# Patient Record
Sex: MALE | Race: WHITE | NOT HISPANIC OR LATINO | URBAN - METROPOLITAN AREA
[De-identification: names, ages, dates, MRNs, and addresses within clinical notes are randomized per-mention and may not be internally consistent; named-entity substitution may affect disease eponyms.]

---

## 2023-03-20 ENCOUNTER — PREP FOR SURGERY (OUTPATIENT)
Dept: OTHER | Facility: HOSPITAL | Age: 43
End: 2023-03-20
Payer: COMMERCIAL

## 2023-03-20 ENCOUNTER — HOSPITAL ENCOUNTER (INPATIENT)
Facility: HOSPITAL | Age: 43
LOS: 3 days | Discharge: HOME | DRG: 885 | End: 2023-03-23
Attending: PSYCHIATRY & NEUROLOGY | Admitting: PSYCHIATRY & NEUROLOGY
Payer: COMMERCIAL

## 2023-03-20 DIAGNOSIS — F29 PSYCHOSIS: ICD-10-CM

## 2023-03-20 DIAGNOSIS — F29 PSYCHOSIS: Primary | ICD-10-CM

## 2023-03-20 LAB
ALBUMIN SERPL-MCNC: 3.7 G/DL (ref 3.5–5.2)
ALBUMIN/GLOB SERPL: 1.5 G/DL
ALP SERPL-CCNC: 77 U/L (ref 39–117)
ALT SERPL W P-5'-P-CCNC: 20 U/L (ref 1–41)
ANION GAP SERPL CALCULATED.3IONS-SCNC: 7.9 MMOL/L (ref 5–15)
AST SERPL-CCNC: 19 U/L (ref 1–40)
BASOPHILS # BLD AUTO: 0.13 10*3/MM3 (ref 0–0.2)
BASOPHILS NFR BLD AUTO: 1 % (ref 0–1.5)
BILIRUB SERPL-MCNC: 0.2 MG/DL (ref 0–1.2)
BUN SERPL-MCNC: 8 MG/DL (ref 6–20)
BUN/CREAT SERPL: 7.9 (ref 7–25)
CALCIUM SPEC-SCNC: 9.3 MG/DL (ref 8.6–10.5)
CHLORIDE SERPL-SCNC: 104 MMOL/L (ref 98–107)
CO2 SERPL-SCNC: 27.1 MMOL/L (ref 22–29)
CREAT SERPL-MCNC: 1.01 MG/DL (ref 0.76–1.27)
DEPRECATED RDW RBC AUTO: 40.1 FL (ref 37–54)
EGFRCR SERPLBLD CKD-EPI 2021: 96.4 ML/MIN/1.73
EOSINOPHIL # BLD AUTO: 0.58 10*3/MM3 (ref 0–0.4)
EOSINOPHIL NFR BLD AUTO: 4.4 % (ref 0.3–6.2)
ERYTHROCYTE [DISTWIDTH] IN BLOOD BY AUTOMATED COUNT: 12.1 % (ref 12.3–15.4)
ETHANOL BLD-MCNC: <10 MG/DL (ref 0–10)
ETHANOL UR QL: <0.01 %
GLOBULIN UR ELPH-MCNC: 2.4 GM/DL
GLUCOSE SERPL-MCNC: 114 MG/DL (ref 65–99)
HCT VFR BLD AUTO: 46.4 % (ref 37.5–51)
HGB BLD-MCNC: 16.2 G/DL (ref 13–17.7)
IMM GRANULOCYTES # BLD AUTO: 0.04 10*3/MM3 (ref 0–0.05)
IMM GRANULOCYTES NFR BLD AUTO: 0.3 % (ref 0–0.5)
LYMPHOCYTES # BLD AUTO: 2.3 10*3/MM3 (ref 0.7–3.1)
LYMPHOCYTES NFR BLD AUTO: 17.5 % (ref 19.6–45.3)
MCH RBC QN AUTO: 31.3 PG (ref 26.6–33)
MCHC RBC AUTO-ENTMCNC: 34.9 G/DL (ref 31.5–35.7)
MCV RBC AUTO: 89.7 FL (ref 79–97)
MONOCYTES # BLD AUTO: 0.64 10*3/MM3 (ref 0.1–0.9)
MONOCYTES NFR BLD AUTO: 4.9 % (ref 5–12)
NEUTROPHILS NFR BLD AUTO: 71.9 % (ref 42.7–76)
NEUTROPHILS NFR BLD AUTO: 9.49 10*3/MM3 (ref 1.7–7)
NRBC BLD AUTO-RTO: 0 /100 WBC (ref 0–0.2)
PLATELET # BLD AUTO: 233 10*3/MM3 (ref 140–450)
PMV BLD AUTO: 9.8 FL (ref 6–12)
POTASSIUM SERPL-SCNC: 4.8 MMOL/L (ref 3.5–5.2)
PROT SERPL-MCNC: 6.1 G/DL (ref 6–8.5)
RBC # BLD AUTO: 5.17 10*6/MM3 (ref 4.14–5.8)
SODIUM SERPL-SCNC: 139 MMOL/L (ref 136–145)
T4 FREE SERPL-MCNC: 1.12 NG/DL (ref 0.93–1.7)
TSH SERPL DL<=0.05 MIU/L-ACNC: 1.41 UIU/ML (ref 0.27–4.2)
WBC NRBC COR # BLD: 13.18 10*3/MM3 (ref 3.4–10.8)

## 2023-03-20 PROCEDURE — 82077 ASSAY SPEC XCP UR&BREATH IA: CPT | Performed by: PSYCHIATRY & NEUROLOGY

## 2023-03-20 PROCEDURE — 85025 COMPLETE CBC W/AUTO DIFF WBC: CPT | Performed by: PSYCHIATRY & NEUROLOGY

## 2023-03-20 PROCEDURE — 84443 ASSAY THYROID STIM HORMONE: CPT | Performed by: PSYCHIATRY & NEUROLOGY

## 2023-03-20 PROCEDURE — 80053 COMPREHEN METABOLIC PANEL: CPT | Performed by: PSYCHIATRY & NEUROLOGY

## 2023-03-20 PROCEDURE — 84439 ASSAY OF FREE THYROXINE: CPT | Performed by: PSYCHIATRY & NEUROLOGY

## 2023-03-20 RX ORDER — ALUMINA, MAGNESIA, AND SIMETHICONE 2400; 2400; 240 MG/30ML; MG/30ML; MG/30ML
15 SUSPENSION ORAL EVERY 6 HOURS PRN
Status: DISCONTINUED | OUTPATIENT
Start: 2023-03-20 | End: 2023-03-23 | Stop reason: HOSPADM

## 2023-03-20 RX ORDER — RISPERIDONE 2 MG/1
2 TABLET ORAL 2 TIMES DAILY
Status: CANCELLED | OUTPATIENT
Start: 2023-03-20

## 2023-03-20 RX ORDER — LORAZEPAM 2 MG/ML
2 INJECTION INTRAMUSCULAR EVERY 4 HOURS PRN
Status: CANCELLED | OUTPATIENT
Start: 2023-03-20 | End: 2023-03-27

## 2023-03-20 RX ORDER — HALOPERIDOL 5 MG/ML
5 INJECTION INTRAMUSCULAR EVERY 4 HOURS PRN
Status: CANCELLED | OUTPATIENT
Start: 2023-03-20

## 2023-03-20 RX ORDER — LORAZEPAM 2 MG/1
2 TABLET ORAL EVERY 4 HOURS PRN
Status: CANCELLED | OUTPATIENT
Start: 2023-03-20 | End: 2023-03-27

## 2023-03-20 RX ORDER — DIPHENHYDRAMINE HCL 50 MG
50 CAPSULE ORAL EVERY 4 HOURS PRN
Status: CANCELLED | OUTPATIENT
Start: 2023-03-20

## 2023-03-20 RX ORDER — ACETAMINOPHEN 325 MG/1
650 TABLET ORAL EVERY 4 HOURS PRN
Status: CANCELLED | OUTPATIENT
Start: 2023-03-20

## 2023-03-20 RX ORDER — LOPERAMIDE HYDROCHLORIDE 2 MG/1
2 CAPSULE ORAL
Status: CANCELLED | OUTPATIENT
Start: 2023-03-20

## 2023-03-20 RX ORDER — TRAZODONE HYDROCHLORIDE 50 MG/1
100 TABLET ORAL NIGHTLY PRN
Status: DISCONTINUED | OUTPATIENT
Start: 2023-03-20 | End: 2023-03-23 | Stop reason: HOSPADM

## 2023-03-20 RX ORDER — HALOPERIDOL 5 MG/1
5 TABLET ORAL EVERY 4 HOURS PRN
Status: CANCELLED | OUTPATIENT
Start: 2023-03-20

## 2023-03-20 RX ORDER — ACETAMINOPHEN 325 MG/1
650 TABLET ORAL EVERY 4 HOURS PRN
Status: DISCONTINUED | OUTPATIENT
Start: 2023-03-20 | End: 2023-03-23 | Stop reason: HOSPADM

## 2023-03-20 RX ORDER — NICOTINE 21 MG/24HR
1 PATCH, TRANSDERMAL 24 HOURS TRANSDERMAL
Status: CANCELLED | OUTPATIENT
Start: 2023-03-20

## 2023-03-20 RX ORDER — DIPHENHYDRAMINE HYDROCHLORIDE 50 MG/ML
50 INJECTION INTRAMUSCULAR; INTRAVENOUS EVERY 4 HOURS PRN
Status: CANCELLED | OUTPATIENT
Start: 2023-03-20

## 2023-03-20 RX ORDER — NICOTINE 21 MG/24HR
1 PATCH, TRANSDERMAL 24 HOURS TRANSDERMAL
Status: DISCONTINUED | OUTPATIENT
Start: 2023-03-20 | End: 2023-03-23 | Stop reason: HOSPADM

## 2023-03-20 RX ORDER — ALUMINA, MAGNESIA, AND SIMETHICONE 2400; 2400; 240 MG/30ML; MG/30ML; MG/30ML
15 SUSPENSION ORAL EVERY 6 HOURS PRN
Status: CANCELLED | OUTPATIENT
Start: 2023-03-20

## 2023-03-20 RX ORDER — HYDROXYZINE PAMOATE 50 MG/1
50 CAPSULE ORAL EVERY 6 HOURS PRN
Status: DISCONTINUED | OUTPATIENT
Start: 2023-03-20 | End: 2023-03-23 | Stop reason: HOSPADM

## 2023-03-20 RX ORDER — TRAZODONE HYDROCHLORIDE 100 MG/1
100 TABLET ORAL NIGHTLY PRN
Status: CANCELLED | OUTPATIENT
Start: 2023-03-20

## 2023-03-20 RX ORDER — HYDROXYZINE PAMOATE 50 MG/1
50 CAPSULE ORAL EVERY 6 HOURS PRN
Status: CANCELLED | OUTPATIENT
Start: 2023-03-20

## 2023-03-20 RX ORDER — LOPERAMIDE HYDROCHLORIDE 2 MG/1
2 CAPSULE ORAL
Status: DISCONTINUED | OUTPATIENT
Start: 2023-03-20 | End: 2023-03-23 | Stop reason: HOSPADM

## 2023-03-20 RX ADMIN — ACETAMINOPHEN 650 MG: 325 TABLET ORAL at 17:25

## 2023-03-20 RX ADMIN — ACETAMINOPHEN 650 MG: 325 TABLET ORAL at 23:20

## 2023-03-20 RX ADMIN — NICOTINE 1 PATCH: 21 PATCH, EXTENDED RELEASE TRANSDERMAL at 17:25
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
## 2023-03-21 PROBLEM — K40.90 INGUINAL HERNIA, RIGHT: Status: ACTIVE | Noted: 2023-03-21

## 2023-03-21 PROBLEM — K59.00 CONSTIPATION: Status: ACTIVE | Noted: 2023-03-21

## 2023-03-21 PROCEDURE — 99222 1ST HOSP IP/OBS MODERATE 55: CPT | Performed by: FAMILY MEDICINE

## 2023-03-21 RX ORDER — BISACODYL 5 MG/1
5 TABLET, DELAYED RELEASE ORAL DAILY PRN
Status: DISCONTINUED | OUTPATIENT
Start: 2023-03-21 | End: 2023-03-23 | Stop reason: HOSPADM

## 2023-03-21 RX ORDER — LORAZEPAM 2 MG/ML
2 INJECTION INTRAMUSCULAR EVERY 4 HOURS PRN
Status: DISCONTINUED | OUTPATIENT
Start: 2023-03-21 | End: 2023-03-23 | Stop reason: HOSPADM

## 2023-03-21 RX ORDER — POLYETHYLENE GLYCOL 3350 17 G/17G
17 POWDER, FOR SOLUTION ORAL DAILY PRN
Status: DISCONTINUED | OUTPATIENT
Start: 2023-03-21 | End: 2023-03-23 | Stop reason: HOSPADM

## 2023-03-21 RX ORDER — DIPHENHYDRAMINE HYDROCHLORIDE 50 MG/ML
50 INJECTION INTRAMUSCULAR; INTRAVENOUS EVERY 4 HOURS PRN
Status: DISCONTINUED | OUTPATIENT
Start: 2023-03-21 | End: 2023-03-23 | Stop reason: HOSPADM

## 2023-03-21 RX ORDER — HALOPERIDOL 5 MG/1
5 TABLET ORAL EVERY 4 HOURS PRN
Status: DISCONTINUED | OUTPATIENT
Start: 2023-03-21 | End: 2023-03-23 | Stop reason: HOSPADM

## 2023-03-21 RX ORDER — LORAZEPAM 2 MG/1
2 TABLET ORAL EVERY 4 HOURS PRN
Status: DISCONTINUED | OUTPATIENT
Start: 2023-03-21 | End: 2023-03-23 | Stop reason: HOSPADM

## 2023-03-21 RX ORDER — RISPERIDONE 2 MG/1
2 TABLET ORAL 2 TIMES DAILY
Status: DISCONTINUED | OUTPATIENT
Start: 2023-03-21 | End: 2023-03-23 | Stop reason: HOSPADM

## 2023-03-21 RX ORDER — AMOXICILLIN 250 MG
2 CAPSULE ORAL 2 TIMES DAILY
Status: DISCONTINUED | OUTPATIENT
Start: 2023-03-21 | End: 2023-03-23 | Stop reason: HOSPADM

## 2023-03-21 RX ORDER — HALOPERIDOL 5 MG/ML
5 INJECTION INTRAMUSCULAR EVERY 4 HOURS PRN
Status: DISCONTINUED | OUTPATIENT
Start: 2023-03-21 | End: 2023-03-23 | Stop reason: HOSPADM

## 2023-03-21 RX ORDER — DIPHENHYDRAMINE HCL 50 MG
50 CAPSULE ORAL EVERY 4 HOURS PRN
Status: DISCONTINUED | OUTPATIENT
Start: 2023-03-21 | End: 2023-03-23 | Stop reason: HOSPADM

## 2023-03-21 RX ORDER — BISACODYL 10 MG
10 SUPPOSITORY, RECTAL RECTAL DAILY PRN
Status: DISCONTINUED | OUTPATIENT
Start: 2023-03-21 | End: 2023-03-23 | Stop reason: HOSPADM

## 2023-03-21 RX ADMIN — ACETAMINOPHEN 650 MG: 325 TABLET ORAL at 20:06

## 2023-03-21 RX ADMIN — SENNOSIDES AND DOCUSATE SODIUM 2 TABLET: 8.6; 5 TABLET ORAL at 22:44

## 2023-03-21 RX ADMIN — NICOTINE 1 PATCH: 21 PATCH, EXTENDED RELEASE TRANSDERMAL at 12:00

## 2023-03-21 RX ADMIN — ACETAMINOPHEN 650 MG: 325 TABLET ORAL at 14:20
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
## 2023-03-21 NOTE — PLAN OF CARE
Goal Outcome Evaluation:  Plan of Care Reviewed With: patient  Patient Agreement with Plan of Care: agrees   PATIENT ALERT AND ORIENTED AND CALM AND COOPERATIVE WITH STAFF. PATIENT REFUSED AM DOSE OF RISPERDAL AND REPORTS HE DOES NOT NEED ANY MEDICATIONS. PATIENT DENIES S/I,H/I OR HALLUCINATIONS. PATIENT HAS BEEN UP AND ABOUT ON UNIT WITH LITTLE INTERACTION WITH PEERS. NO INAPPROPRIATE OR AGGRESSIVE BEHAVIOR NOTED.
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
## 2023-03-22 ENCOUNTER — APPOINTMENT (OUTPATIENT)
Dept: CT IMAGING | Facility: HOSPITAL | Age: 43
DRG: 885 | End: 2023-03-22
Payer: COMMERCIAL

## 2023-03-22 LAB
AMPHET+METHAMPHET UR QL: NEGATIVE
BARBITURATES UR QL SCN: NEGATIVE
BENZODIAZ UR QL SCN: NEGATIVE
BILIRUB UR QL STRIP: NEGATIVE
CANNABINOIDS SERPL QL: NEGATIVE
CLARITY UR: CLEAR
COCAINE UR QL: NEGATIVE
COLOR UR: YELLOW
GLUCOSE UR STRIP-MCNC: NEGATIVE MG/DL
HGB UR QL STRIP.AUTO: NEGATIVE
KETONES UR QL STRIP: NEGATIVE
LEUKOCYTE ESTERASE UR QL STRIP.AUTO: NEGATIVE
METHADONE UR QL SCN: NEGATIVE
NITRITE UR QL STRIP: NEGATIVE
OPIATES UR QL: NEGATIVE
OXYCODONE UR QL SCN: NEGATIVE
PH UR STRIP.AUTO: 7.5 [PH] (ref 5–8)
PROT UR QL STRIP: NEGATIVE
SP GR UR STRIP: <=1.005 (ref 1–1.03)
UROBILINOGEN UR QL STRIP: NORMAL

## 2023-03-22 PROCEDURE — 80307 DRUG TEST PRSMV CHEM ANLYZR: CPT | Performed by: PSYCHIATRY & NEUROLOGY

## 2023-03-22 PROCEDURE — 74176 CT ABD & PELVIS W/O CONTRAST: CPT

## 2023-03-22 PROCEDURE — 0 IOHEXOL 12 MG/ML SOLUTION: Performed by: PSYCHIATRY & NEUROLOGY

## 2023-03-22 PROCEDURE — 81003 URINALYSIS AUTO W/O SCOPE: CPT | Performed by: PSYCHIATRY & NEUROLOGY

## 2023-03-22 RX ADMIN — ACETAMINOPHEN 650 MG: 325 TABLET ORAL at 23:59

## 2023-03-22 RX ADMIN — IOHEXOL 500 ML: 12 SOLUTION ORAL at 19:42

## 2023-03-22 RX ADMIN — ALUMINUM HYDROXIDE, MAGNESIUM HYDROXIDE, AND DIMETHICONE 15 ML: 400; 400; 40 SUSPENSION ORAL at 14:05

## 2023-03-22 RX ADMIN — NICOTINE 1 PATCH: 21 PATCH, EXTENDED RELEASE TRANSDERMAL at 11:59

## 2023-03-22 RX ADMIN — ACETAMINOPHEN 650 MG: 325 TABLET ORAL at 12:10

## 2023-03-22 RX ADMIN — ACETAMINOPHEN 650 MG: 325 TABLET ORAL at 00:16

## 2023-03-22 RX ADMIN — ACETAMINOPHEN 650 MG: 325 TABLET ORAL at 18:24

## 2023-03-22 NOTE — PLAN OF CARE
"Goal Outcome Evaluation:  Plan of Care Reviewed With: patient  Patient Agreement with Plan of Care: disagrees (describe)     Progress: improving       Pt was awake and alert upon assessment,  clear speech, good eye contact, able to make needs known, contracts for safety, pt denies SI/HI and AVH. Pt reports his admission is from a \"big misunderstanding.\" U/S is ordered for patient right inguinal hernai, see order, clean cath is ordered and specimen cup in room for collection, pt reminded specimen is needed. Pt WBC is 13.18, pt reports having blood in the urine in recent past. Patient rate denies and depression, pt refuses Risperdal, pt took Tylenol for pain 2x for right inguinal hernia. Pain meds are effective. Patient has slept throughout night without issues. Will continue to monitor and provide safe environment.  "

## 2023-03-22 NOTE — PROGRESS NOTES
" [facility]     Psychiatric Progress Note    Patient Name: Nahum Jara  : 1980  MRN: [id number]  Primary Care Physician:  Provider, No Known  Date of admission: 3/20/2023    Subjective   Subjective     Patient seen and chart reviewed, discussed with staff.    Chief Complaint: Bizarre behavior, psychosis      HPI:     Staff reports the patient continues to refuse all medications and states that he does not have any psychiatric problems.  He was noted to slept well last night.  Continues to be very vague and guarded.  Reports having \"a little\" anxiety and depression.  Is still not provide urine sample    He continues to be somatic today and complaining of constipation, but reports he has been able to do so very small bowel movement this morning.  Reports all measures to help at this point have been ineffective.  He also reports side effects from medications.    Patient is up in day room and appropriate with peers.  There is no psychomotor restlessness or agitation.  He reports he is very tired when we talked yesterday.  He is engaging today but continues to be very guarded and evasive.  It is very difficult to get him to answer questions directly.  He remains somatic but symptoms continued to be vague, and he often contradicts himself about symptoms.  When asked for more specific symptoms he does not provide them.  He is a little bizarre in his presentation because of this inability to appear to track conversation.    He states on numerous occasions that he is okay.  Reports that his mood is good.  Patient denies use of substances but reports that he has been on Suboxone in the past and when asked why he was on Suboxone he responds, \"for a better standard of living.\"  States he has not received a prescription for some time but has saved some and will \"take us back off when every now and then.\".  He is continuing to use opiates.  The patient has cleared from yesterday to today and may be from coming " "down off of substances as he is refused to provide a urine suspect has been using methamphetamine.      Objective   Objective     Vitals:   Temp:  [98.4 °F (36.9 °C)] 98.4 °F (36.9 °C)  Heart Rate:  [72-84] 84  Resp:  [16] 16  BP: (110-125)/(78-80) 125/78          Mental Status Exam:      Appearance:   Slightly disheveled, at the day room and appropriate  Reliability:   Poor  Eye Contact:   Limited  Concentration/Focus:    Answers questions but evasive and guarded  Behaviors:    No psychomotor restlessness or agitation  Memory :    Sensorium intact, no deficit  Speech:    Normal rate and volume, but minimal  Language:   Appropriate, relevant  Mood :    \"Fine\"  Affect:    Slightly blunted but euthymic overall  Thought process:    Somatic, guarded, evasive  Thought Content:    Denies suicidal ideation responds, \"no, never\" when asked, no homicidal ideation, denies hallucination  Insight:   Limited  Judgement:    No behavioral disturbance but overall impaired      Result Review    Result Review:  I have personally reviewed the results from the time of this admission to 3/22/2023 16:02 EDT and agree with these findings:  []  Laboratory  []  Microbiology  []  Radiology  []  EKG/Telemetry   []  Cardiology/Vascular   []  Pathology  []  Old records  []  Other:  Most notable findings include:     Lab Results (last 24 hours)     ** No results found for the last 24 hours. **              Medications:   nicotine, 1 patch, Transdermal, Q24H  risperiDONE, 2 mg, Oral, BID  senna-docusate sodium, 2 tablet, Oral, BID          Assessment / Plan       Active Hospital Problems:  Active Hospital Problems    Diagnosis    • **Psychosis (HCC)    • Inguinal hernia, right    • Constipation        Plan:     • Encourage medication  • Encouraged the patient to give a urine sample  • Patient is on MIW appears to be stabilizing and suspect is due to substances.  No agitation and has not required any as needed and suspect may be able to be " discharged home  • Work on mood stabilization and abatement of any suicidal ideation or psychosis.  Work on appropriate safety plan  • Continue supportive therapy  • Patient to engage in all group and individual treatment modalities available on the unit  • Obtain collateral information if possible  • Titrate medications as clinically indicated  • Work on appropriate disposition follow-up including referrals to substance abuse treatment if indicated      Disposition:  I expect patient to be discharged 1 to 2 days.    Part of this note may be an electronic transcription/translation of spoken language to printed text using the Dragon dictation system.         Electronically signed by Sarwat Rivas MD, 03/22/23, 4:02 PM EDT.

## 2023-03-22 NOTE — PLAN OF CARE
"Goal Outcome Evaluation:  Plan of Care Reviewed With: patient  Patient Agreement with Plan of Care: agrees         Pt has been withdrawn for much of the day, but attends group. He denies SI/HI, AVH.  He reports \"a little\" anxiety, and is vague when asked about depression.  He is guarded. He is able to make his needs known. Will continue to monitor.   "

## 2023-03-22 NOTE — CONSULTS
[facility]   Hospitalist Consult Note  Date: 3/21/2023   Patient Name: Nahum Jara  : 1980  MRN: [id number]  Primary Care Physician:  Provider, No Known  Referring Physician: Sarwat Rivas MD  Date of admission: 3/20/2023    Subjective   Subjective     Reason for Consult/ Chief Complaint: inguinal hernia    HPI:  Nahum Jara is a 42 y.o. male is admitted to the psychiatric facility for MIW.  During intake he remarked about his inguinal hernia which has been present for several months.  He is still able to have bowel movements.  He reports that he used to be able to push it back in, and it would stay in, but now he has to hold it in when he strains.  Currently not causing any pain.  He had an ultrasound done several months ago but did not follow up. We were asked to evaluate for any urgent need for intervention.       Personal History     Past Medical History:  Inguinal hernia  Substance abuse    Family History:   IUP      Social History:   Social History     Tobacco Use   • Smoking status: Every Day     Packs/day: 1.00     Types: Cigarettes   • Smokeless tobacco: Current   Vaping Use   • Vaping Use: Unknown   Substance Use Topics   • Alcohol use: Not Currently   • Drug use: Yes     Types: Other     Comment: patient refuses to specify just reports whatever i can get and as much as i can get         Home Medications:       Allergies:  No Known Allergies      Objective    Objective     Vitals:   Temp:  [97.7 °F (36.5 °C)] 97.7 °F (36.5 °C)  Heart Rate:  [83] 83  Resp:  [12] 12  BP: (113)/(64) 113/64    Physical Exam:   Constitutional: Awake, alert, no acute distress   Eyes: Pupils equal, sclerae anicteric, no conjunctival injection   Respiratory: Clear to auscultation bilaterally, nonlabored respirations    Cardiovascular: RRR, no murmurs, rubs, or gallops, palpable pedal pulses bilaterally   Gastrointestinal: Positive bowel sounds, soft, nontender, nondistended   Musculoskeletal: No major  deformities   Psychiatric: Appropriate affect, cooperative   Neurologic: Oriented x 3, speech clear   : Deferred in this setting     Result Review        Assessment & Plan   Assessment / Plan     Assessment/Plan:  • Inguinal hernia - chronic  • Inguinal cutaneous yeast       No urgent need for intervention identified  Repeat ultrasound of inguinal hernia non-urgently  Outpatient referral to general surgery  Stool softeners and laxatives as needed  Topical antifungal cream     DVT prophylaxis:  Mechanical DVT prophylaxis orders are present.    CODE STATUS:    Code Status (Patient has no pulse and is not breathing): CPR (Attempt to Resuscitate)  Medical Interventions (Patient has pulse or is breathing): Full Support

## 2023-03-23 VITALS
SYSTOLIC BLOOD PRESSURE: 125 MMHG | WEIGHT: 165.57 LBS | OXYGEN SATURATION: 100 % | HEART RATE: 74 BPM | TEMPERATURE: 97.9 F | BODY MASS INDEX: 23.18 KG/M2 | RESPIRATION RATE: 16 BRPM | HEIGHT: 71 IN | DIASTOLIC BLOOD PRESSURE: 75 MMHG

## 2023-03-23 PROBLEM — F29 PSYCHOSIS: Status: RESOLVED | Noted: 2023-03-20 | Resolved: 2023-03-23

## 2023-03-23 RX ADMIN — NICOTINE 1 PATCH: 21 PATCH, EXTENDED RELEASE TRANSDERMAL at 09:21

## 2023-03-23 NOTE — PROGRESS NOTES
Order for US changed to CT.  Still awaiting results.  Further recs per results of imaging.  I suspect this will be able to be followed up as an outpatient.  I will follow up on the results of the imaging when available.  Please call me with any issues.

## 2023-03-23 NOTE — PLAN OF CARE
Goal Outcome Evaluation:  Plan of Care Reviewed With: patient  Patient Agreement with Plan of Care: agrees     Progress: improving    Patient was awake and alert upon assessment, clear speech, good eye contact, decribes mood as good, flat affect, easy to engage but dismissive at times, pt denies SI, HI and denies AVH, pt denies depression and anxiety, pt took Tylenol for pain, see eMAR, pt refused Risperdal and BM med, pt had CT scan of right abdominal/groin for hernia, pt is cooperative and has been in day room during beginning of shifts, pt has slept well since coming back from radiology, contracts for safety and able to make needs known. Will continue to monitor and provide safe environment.

## 2023-03-23 NOTE — SIGNIFICANT NOTE
03/23/23 0914   Plan   Plan Patient on MIW, currently refusing all meds and outpatient follow up. Patient will discharg to his private home that he reports is on family property.   Patient/Family in Agreement with Plan yes  (Patient refusing outpatient treatment.)   Final Discharge Disposition Code 01 - home or self-care   Final Note MIW hold expires today, patient will discharge home.

## 2023-03-23 NOTE — PLAN OF CARE
Goal Outcome Evaluation:  Plan of Care Reviewed With: patient  Patient Agreement with Plan of Care: agrees         Pt has been withdrawn to his room, but later out in dayroom.  He denies SI/HI, AVH, and contracts for safety.  He denies all s/s. Pt is able to make his needs known.  Safety plan complete. Will continue to monitor.

## 2023-03-23 NOTE — DISCHARGE SUMMARY
[facility]         DISCHARGE SUMMARY    Patient Name: Nahum Jara  : 1980  MRN: [id number]    Date of Admission: 3/20/2023  Date of Discharge: 3/23/2023  Primary Care Physician: Provider, No Known    Consults     Date and Time Order Name Status Description    3/21/2023 11:08 AM Inpatient Hospitalist Consult Completed           Presenting Problem:   Psychosis (HCC) [F29]    Active and Resolved Hospital Problems:  Active Hospital Problems    Diagnosis POA   • Inguinal hernia, right [K40.90] Yes   • Constipation [K59.00] Yes      Resolved Hospital Problems    Diagnosis POA   • **Psychosis (HCC) [F29] Yes         Hospital Course     Hospital Course:  Nahum Jara is a 42 y.o. male admitted on a mental Inquest warrant taken out by family for reported physical threats towards them.  There is also the threat of some possible suicidal ideations.    Patient is denied suicidal or homicidal ideations throughout his stay.  Patient initially was somewhat difficult to engage.  Possibly have some underlying psychosis or delusional thinking, but this dissipated throughout his stay.  Risperdal was prescribed but he has refused medication throughout his stay.  He has shown improvement in mood and psychosis appears to abated and is not responding to internal stimuli.    Patient reported he had a hernia and was complaining of some constipation.  However he reports he was able to pass gas as well as have small bowel movements.  He reported stool softeners created side effects and he refused to take them after the first dose.  Maalox was not effective.  He was seen by the medicine team for this evaluated.  He had a CT completed that showed inguinal hernia and an incidental diverticuli in his colon.  We will make a surgical referral for follow-up at discharge.    Patient reports he had used methamphetamine recently, but refused to engage or answer questions about his use and frequency of his use.   "He waited to give a urine sample until 2 days into his hospital stay and the toxicology screen by that time was negative.  Suspect he may have not been compliant with giving urine right away out of fears that would be positive.  He appears to have detoxed, if he had been on methamphetamine, but at this point.    He is calm and cooperative.  He has had no acute agitation.  He has been compliant with milieu and has been appropriate with peers and staff.  The patient was able to go down to get a CT scan completed and had no difficulties in doing this and no behavioral disturbance.  He has been fully oriented.  He is calm and cooperative.  Patient this point appears to be better and has not had any medications.  Patient does not meet criteria for further hospitalization but should follow up with outpatient providers for ongoing evaluation and treatment as well as substance abuse treatment.    Date of discharge she is calm, cooperative, engaging, participates fully in exam.  There is no psychomotor restlessness or agitation.  He is future oriented goal-directed.  Speech is spontaneous, normal rate and volume.  Language is appropriate relevant.  Mood is described as \"good\" and affect is euthymic.  Thought processes are sequential and goal directed with no elicited delusions or paranoid thinking.  Thought content is negative for suicidal or homicidal ideation and he has no hallucinations noted.  Insight and judgment are fair and appear to be improved.    Denies any thoughts of harm to self or to others.  Denies any plan or intent.        DISCHARGE Follow Up Recommendations for labs and diagnostics: Primary care for routine health maintenance, surgery for evaluation of hernia for surgical repair, substance use treatment, community mental [facility]      Day of Discharge     Vital Signs:  Temp:  [97.9 °F (36.6 °C)-98.4 °F (36.9 °C)] 97.9 °F (36.6 °C)  Heart Rate:  [74-90] 74  Resp:  [16-18] 16  BP: (123-125)/(75-87) " 125/75      Pertinent  and/or Most Recent Results     LAB RESULTS:      Lab 03/20/23  1741   WBC 13.18*   HEMOGLOBIN 16.2   HEMATOCRIT 46.4   PLATELETS 233   NEUTROS ABS 9.49*   IMMATURE GRANS (ABS) 0.04   LYMPHS ABS 2.30   MONOS ABS 0.64   EOS ABS 0.58*   MCV 89.7         Lab 03/20/23  1741   SODIUM 139   POTASSIUM 4.8   CHLORIDE 104   CO2 27.1   ANION GAP 7.9   BUN 8   CREATININE 1.01   EGFR 96.4   GLUCOSE 114*   CALCIUM 9.3   TSH 1.410         Lab 03/20/23  1741   TOTAL PROTEIN 6.1   ALBUMIN 3.7   GLOBULIN 2.4   ALT (SGPT) 20   AST (SGOT) 19   BILIRUBIN 0.2   ALK PHOS 77                                     Lab 03/20/23  1741   ETHANOL PCT <0.010   ETHANOL MGDL <10         Lab 03/22/23  1820   AMPH/METHAM SCREEN, URINE Negative   BENZODIAZEPINE SCREEN, URINE Negative   COCAINE SCREEN, URINE Negative   OPIATES Negative   THC URINE SCREEN Negative   METHADONE SCREEN, URINE Negative     Brief Urine Lab Results  (Last result in the past 365 days)      Color   Clarity   Blood   Leuk Est   Nitrite   Protein   CREAT   Urine HCG        03/22/23 1820 Yellow   Clear   Negative   Negative   Negative   Negative                    CT Abdomen Pelvis Without Contrast    Result Date: 3/23/2023  Impression:    1. There is a right-sided inguinal hernia, which contains fat and vessels and extends into the scrotal sac.  It does not contain bowel.   2. The appendix is doubt acute abnormality.   3. There is diffuse colonic diverticulosis without acute diverticulitis.   4. No mechanical bowel obstruction is seen.  No pneumoperitoneum pneumatosis.   5. Please see above comments for further detail.    Please note that portions of this note were completed with a voice recognition program.  AUSTIN BROWN JR, MD       Electronically Signed and Approved By: AUSTIN BROWN JR, MD on 3/23/2023 at 7:30                            Imaging Results (Last 7 Days)     Procedure Component Value Units Date/Time    CT Abdomen Pelvis Without Contrast  [[id number]] Collected: 03/23/23 0730     Updated: 03/23/23 0733    Narrative:      PROCEDURE: CT ABDOMEN PELVIS WO CONTRAST     COMPARISON: None.     INDICATIONS: 42-YEAR-OLD MALE W/ H/O HERNIA, NOS, COMPLICATED; RIGHT GROIN PAIN & CONSTIPATION X   5 MONTHS.     TECHNIQUE: 525 CT images were created without intravenous contrast.  Oral contrast agent was   administered for the study.       PROTOCOL:   Standard CT imaging protocol performed.      RADIATION:   Total DLP: 299 mGy*cm    Automated exposure control was utilized to minimize radiation dose.      FINDINGS: There is a right inguinal hernia, which contains fat and vasculature and no bowel.  It   extends into the scrotal sac.  It is estimated at about 16.1 x 6 x 4.6 cm in craniocaudal,   transverse, and anteroposterior (AP) extent, respectively, as seen on image 120 of series 5, image   34 of series 3, image 105 of series 2, and adjacent images.  No definite ascites is contained   within the inguinal hernia on the right.  There is no nonenhanced CT evidence strangulation of the   fat or the vasculature contained in the right inguinal hernia sac.  No definite left-sided inguinal   hernia is appreciated.       Colonic diverticula are seen without acute diverticulitis.  The appendix is identified in the   subhepatic region, as on coronal image 64 series 3 and adjacent images, and is without acute   abnormality; it does not opacify with the oral contrast agent, however.  No mechanical bowel   obstruction.  No pneumoperitoneum pneumatosis.  A tiny umbilical hernia is seen.  It does not   contain bowel.  It measures about 1.5 cm in transverse diameter, as on image 63 of series 5 and   adjacent images.  The gallbladder is partially contracted.  No gallstones.  No definite acute   cholecystitis.  No acute pancreatitis.  No adrenal mass.  No renal/ureteral stones or   hydronephrosis or obstructive uropathy.  The partially imaged lung bases are clear of acute    infiltrate.  No suspicious pulmonary nodules are seen within the partially imaged lung bases.  No   acute fracture.  No aggressive osseous lesion is suggested.  There may be minimal sclerotic changes   of the bilateral sacroiliac joints.  No acute intraperitoneal or retroperitoneal hemorrhage.    Minimal if any atherosclerotic change is suggested by nonenhanced CT.       Impression:            1. There is a right-sided inguinal hernia, which contains fat and vessels and extends into the   scrotal sac.  It does not contain bowel.       2. The appendix is doubt acute abnormality.       3. There is diffuse colonic diverticulosis without acute diverticulitis.       4. No mechanical bowel obstruction is seen.  No pneumoperitoneum pneumatosis.       5. Please see above comments for further detail.          Please note that portions of this note were completed with a voice recognition program.     AUSTIN BROWN JR, MD         Electronically Signed and Approved By: AUSTIN BROWN JR, MD on 3/23/2023 at 7:30                               Labs Pending at Discharge:           Discharge Details        Discharge Medications      Patient Not Prescribed Medications Upon Discharge         No Known Allergies      Discharge Disposition:  Home or Self Care    Diet:  Hospital:  Diet Order   Procedures   • Diet: Regular/House Diet; Texture: Regular Texture (IDDSI 7); Fluid Consistency: Thin (IDDSI 0)         Discharge Activity:   Activity Instructions     Activity as Tolerated            Discharge Condition: Stable    CODE STATUS:  Code Status and Medical Interventions:   Ordered at: 03/21/23 1058     Code Status (Patient has no pulse and is not breathing):    CPR (Attempt to Resuscitate)     Medical Interventions (Patient has pulse or is breathing):    Full Support         No future appointments.    Additional Instructions for the Follow-ups that You Need to Schedule     Discharge Follow-up with PCP   As directed       Currently  Documented PCP:    Provider, No Known    PCP Phone Number:    None     Follow Up Details: As needed         Discharge Follow-up with Specified Provider: [facility]   As directed      To: [facility]         Discharge Follow-up with Specified Provider: General surgery   As directed      To: General surgery               Time spent on Discharge including face to face service: 40 minutes    Part of this note may be an electronic transcription/translation of spoken language to printed text using the Dragon dictation system.        Electronically signed by Sarwat Rivas MD, 03/23/23, 9:59 AM EDT.

## 2023-09-08 ENCOUNTER — TRANSCRIBE ORDERS (OUTPATIENT)
Dept: LAB | Facility: HOSPITAL | Age: 43
End: 2023-09-08
Payer: COMMERCIAL

## 2023-09-08 ENCOUNTER — LAB (OUTPATIENT)
Dept: LAB | Facility: HOSPITAL | Age: 43
End: 2023-09-08
Payer: COMMERCIAL

## 2023-09-08 DIAGNOSIS — Z11.59 SCREENING EXAMINATION FOR POLIOMYELITIS: ICD-10-CM

## 2023-09-08 DIAGNOSIS — Z11.4 SCREENING FOR HUMAN IMMUNODEFICIENCY VIRUS: ICD-10-CM

## 2023-09-08 DIAGNOSIS — F11.20 OPIOID TYPE DEPENDENCE, CONTINUOUS: ICD-10-CM

## 2023-09-08 DIAGNOSIS — R53.83 TIREDNESS: ICD-10-CM

## 2023-09-08 DIAGNOSIS — F11.20 OPIOID TYPE DEPENDENCE, CONTINUOUS: Primary | ICD-10-CM

## 2023-09-08 LAB
ALBUMIN SERPL-MCNC: 4.3 G/DL (ref 3.5–5.2)
ALBUMIN/GLOB SERPL: 1.7 G/DL
ALP SERPL-CCNC: 88 U/L (ref 39–117)
ALT SERPL W P-5'-P-CCNC: 61 U/L (ref 1–41)
ANION GAP SERPL CALCULATED.3IONS-SCNC: 8 MMOL/L (ref 5–15)
AST SERPL-CCNC: 31 U/L (ref 1–40)
BASOPHILS # BLD AUTO: 0.13 10*3/MM3 (ref 0–0.2)
BASOPHILS NFR BLD AUTO: 0.7 % (ref 0–1.5)
BILIRUB SERPL-MCNC: 0.2 MG/DL (ref 0–1.2)
BUN SERPL-MCNC: 9 MG/DL (ref 6–20)
BUN/CREAT SERPL: 10.1 (ref 7–25)
CALCIUM SPEC-SCNC: 10 MG/DL (ref 8.6–10.5)
CHLORIDE SERPL-SCNC: 103 MMOL/L (ref 98–107)
CO2 SERPL-SCNC: 28 MMOL/L (ref 22–29)
CREAT SERPL-MCNC: 0.89 MG/DL (ref 0.76–1.27)
DEPRECATED RDW RBC AUTO: 40.4 FL (ref 37–54)
EGFRCR SERPLBLD CKD-EPI 2021: 111.1 ML/MIN/1.73
EOSINOPHIL # BLD AUTO: 0.64 10*3/MM3 (ref 0–0.4)
EOSINOPHIL NFR BLD AUTO: 3.7 % (ref 0.3–6.2)
ERYTHROCYTE [DISTWIDTH] IN BLOOD BY AUTOMATED COUNT: 12.3 % (ref 12.3–15.4)
GLOBULIN UR ELPH-MCNC: 2.6 GM/DL
GLUCOSE SERPL-MCNC: 90 MG/DL (ref 65–99)
HAV IGM SERPL QL IA: NORMAL
HBV CORE IGM SERPL QL IA: NORMAL
HBV SURFACE AG SERPL QL IA: NORMAL
HCT VFR BLD AUTO: 47.8 % (ref 37.5–51)
HCV AB SER DONR QL: NORMAL
HGB BLD-MCNC: 16.1 G/DL (ref 13–17.7)
HIV1+2 AB SER QL: NORMAL
IMM GRANULOCYTES # BLD AUTO: 0.08 10*3/MM3 (ref 0–0.05)
IMM GRANULOCYTES NFR BLD AUTO: 0.5 % (ref 0–0.5)
LYMPHOCYTES # BLD AUTO: 2.66 10*3/MM3 (ref 0.7–3.1)
LYMPHOCYTES NFR BLD AUTO: 15.2 % (ref 19.6–45.3)
MCH RBC QN AUTO: 30.4 PG (ref 26.6–33)
MCHC RBC AUTO-ENTMCNC: 33.7 G/DL (ref 31.5–35.7)
MCV RBC AUTO: 90.2 FL (ref 79–97)
MONOCYTES # BLD AUTO: 1.01 10*3/MM3 (ref 0.1–0.9)
MONOCYTES NFR BLD AUTO: 5.8 % (ref 5–12)
NEUTROPHILS NFR BLD AUTO: 12.94 10*3/MM3 (ref 1.7–7)
NEUTROPHILS NFR BLD AUTO: 74.1 % (ref 42.7–76)
NRBC BLD AUTO-RTO: 0 /100 WBC (ref 0–0.2)
PLATELET # BLD AUTO: 257 10*3/MM3 (ref 140–450)
PMV BLD AUTO: 10.3 FL (ref 6–12)
POTASSIUM SERPL-SCNC: 5 MMOL/L (ref 3.5–5.2)
PROT SERPL-MCNC: 6.9 G/DL (ref 6–8.5)
RBC # BLD AUTO: 5.3 10*6/MM3 (ref 4.14–5.8)
SODIUM SERPL-SCNC: 139 MMOL/L (ref 136–145)
TSH SERPL DL<=0.05 MIU/L-ACNC: 2.6 UIU/ML (ref 0.27–4.2)
WBC NRBC COR # BLD: 17.46 10*3/MM3 (ref 3.4–10.8)

## 2023-09-08 PROCEDURE — G0432 EIA HIV-1/HIV-2 SCREEN: HCPCS

## 2023-09-08 PROCEDURE — 80053 COMPREHEN METABOLIC PANEL: CPT

## 2023-09-08 PROCEDURE — 80074 ACUTE HEPATITIS PANEL: CPT

## 2023-09-08 PROCEDURE — 85025 COMPLETE CBC W/AUTO DIFF WBC: CPT

## 2023-09-08 PROCEDURE — 36415 COLL VENOUS BLD VENIPUNCTURE: CPT

## 2023-09-08 PROCEDURE — 84443 ASSAY THYROID STIM HORMONE: CPT

## 2024-11-04 ENCOUNTER — OFFICE VISIT (OUTPATIENT)
Dept: SURGERY | Facility: CLINIC | Age: 44
End: 2024-11-04
Payer: COMMERCIAL

## 2024-11-04 VITALS
HEIGHT: 71 IN | WEIGHT: 166 LBS | HEART RATE: 89 BPM | BODY MASS INDEX: 23.24 KG/M2 | SYSTOLIC BLOOD PRESSURE: 100 MMHG | DIASTOLIC BLOOD PRESSURE: 72 MMHG

## 2024-11-04 DIAGNOSIS — K40.90 RIGHT INGUINAL HERNIA: Primary | ICD-10-CM

## 2024-11-04 PROCEDURE — 1160F RVW MEDS BY RX/DR IN RCRD: CPT | Performed by: STUDENT IN AN ORGANIZED HEALTH CARE EDUCATION/TRAINING PROGRAM

## 2024-11-04 PROCEDURE — 1159F MED LIST DOCD IN RCRD: CPT | Performed by: STUDENT IN AN ORGANIZED HEALTH CARE EDUCATION/TRAINING PROGRAM

## 2024-11-04 PROCEDURE — 99203 OFFICE O/P NEW LOW 30 MIN: CPT | Performed by: STUDENT IN AN ORGANIZED HEALTH CARE EDUCATION/TRAINING PROGRAM

## 2024-11-04 RX ORDER — SODIUM CHLORIDE 9 MG/ML
40 INJECTION, SOLUTION INTRAVENOUS AS NEEDED
OUTPATIENT
Start: 2024-11-04

## 2024-11-04 RX ORDER — SODIUM CHLORIDE 0.9 % (FLUSH) 0.9 %
10 SYRINGE (ML) INJECTION EVERY 12 HOURS SCHEDULED
OUTPATIENT
Start: 2024-11-04

## 2024-11-04 RX ORDER — IBUPROFEN 400 MG/1
400 TABLET, FILM COATED ORAL EVERY 8 HOURS PRN
COMMUNITY
Start: 2024-10-07

## 2024-11-04 RX ORDER — CYCLOBENZAPRINE HCL 5 MG
5 TABLET ORAL ONCE AS NEEDED
COMMUNITY
Start: 2024-10-07

## 2024-11-04 RX ORDER — MAGNESIUM CITRATE 1.75 G/29.6ML
296 LIQUID ORAL ONCE
COMMUNITY
Start: 2024-10-23

## 2024-11-04 RX ORDER — PSEUDOEPHED/ACETAMINOPH/DIPHEN 30MG-500MG
TABLET ORAL
COMMUNITY
Start: 2024-10-07

## 2024-11-04 RX ORDER — BUPRENORPHINE HYDROCHLORIDE AND NALOXONE HYDROCHLORIDE DIHYDRATE 8; 2 MG/1; MG/1
1 TABLET SUBLINGUAL DAILY
COMMUNITY
Start: 2024-10-07

## 2024-11-04 RX ORDER — POLYETHYLENE GLYCOL 3350 17 G/17G
POWDER, FOR SOLUTION ORAL
COMMUNITY
Start: 2024-10-23

## 2024-11-04 RX ORDER — ONDANSETRON 2 MG/ML
4 INJECTION INTRAMUSCULAR; INTRAVENOUS EVERY 6 HOURS PRN
OUTPATIENT
Start: 2024-11-04

## 2024-11-04 RX ORDER — SODIUM CHLORIDE 0.9 % (FLUSH) 0.9 %
10 SYRINGE (ML) INJECTION AS NEEDED
OUTPATIENT
Start: 2024-11-04

## 2024-11-04 RX ORDER — TRAZODONE HYDROCHLORIDE 50 MG/1
50 TABLET, FILM COATED ORAL NIGHTLY
COMMUNITY
Start: 2024-10-07

## 2024-11-04 NOTE — PROGRESS NOTES
Patient Name:  Nahum Jara  YOB: 1980  [id number]    Referring Provider: Nicolas Roman APRN    Patient Care Team:  Provider, No Known as PCP - General      Chief Complaint  Hernia (RIGHT INGUINAL)    Subjective     Nahum Jara is a 44 y.o. male who presents to [facility] GENERAL SURGERY    History of Present Illness  44-year-old male who presents today as a new referral for a right-sided inguinal hernia.  Patient states he had a right inguinal hernia repair as a small child and is had a recurrence for at least the past several years.  It has become progressively larger over time and is started to cause some daily pain.  He is always able to manually reduce the hernia without much difficulty.  He does intermittently struggle with constipation but denies any other symptoms suggestive of obstruction.  He has no previous abdominal surgical history.  Hernia        History     Past Medical History:   Diagnosis Date    Inguinal hernia        Past Surgical History:   Procedure Laterality Date    EAR TUBES      INGUINAL HERNIA REPAIR Right        Family History   Problem Relation Age of Onset    No Known Problems Mother     No Known Problems Father     Stroke Paternal Grandmother     Stroke Paternal Grandfather        Social History     Tobacco Use    Smoking status: Every Day     Current packs/day: 1.00     Types: Cigarettes    Smokeless tobacco: Current   Vaping Use    Vaping status: Former    Substances: Nicotine, THC, CBD, Flavoring    Devices: Disposable, Pre-filled or refillable cartridge, Refillable tank, Pre-filled pod   Substance Use Topics    Alcohol use: Not Currently    Drug use: Yes     Types: Other     Comment: patient refuses to specify just reports whatever i can get and as much as i can get       No Known Allergies    Prior to Admission medications    Medication Sig Start Date End Date Taking? Authorizing Provider   Acetaminophen Extra Strength 500 MG tablet   "10/7/24  Yes Rima Munoz MD   buprenorphine-naloxone (SUBOXONE) 8-2 MG per SL tablet Place 1 tablet under the tongue Daily. 10/7/24  Yes Provider, Historical, MD   cyclobenzaprine (FLEXERIL) 5 MG tablet Take 1 tablet by mouth 1 (One) Time As Needed. 10/7/24  Yes Provider, Historical, MD   ibuprofen (ADVIL,MOTRIN) 400 MG tablet Take 1 tablet by mouth Every 8 (Eight) Hours As Needed. 10/7/24  Yes Provider, Historical, MD   nicotine polacrilex (NICORETTE) 4 MG gum Chew 1 each As Needed. 10/28/24  Yes Provider, Historical, MD   OneLax Magnesium Citrate 1.745 GM/30ML solution solution Take 296 mL by mouth 1 (One) Time. 10/23/24  Yes Provider, Historical, MD   polyethylene glycol (MIRALAX) 17 GM/SCOOP powder  10/23/24  Yes Rima Munoz MD   traZODone (DESYREL) 50 MG tablet Take 1 tablet by mouth Every Night. 10/7/24  Yes Rima Munoz MD       Objective    Objective       BMI is within normal parameters. No other follow-up for BMI required.      Vital Signs:   /72 (BP Location: Left arm, Patient Position: Sitting, Cuff Size: Adult)   Pulse 89   Ht 180.3 cm (71\")   Wt 75.3 kg (166 lb)   BMI 23.15 kg/m²       Physical Exam  Constitutional:       Appearance: Normal appearance.   HENT:      Head: Normocephalic and atraumatic.      Mouth/Throat:      Mouth: Mucous membranes are moist.      Pharynx: Oropharynx is clear.   Cardiovascular:      Rate and Rhythm: Normal rate and regular rhythm.   Pulmonary:      Effort: Pulmonary effort is normal. No respiratory distress.   Abdominal:      General: There is no distension.      Palpations: Abdomen is soft.      Tenderness: There is no abdominal tenderness.      Comments: Right inguinal hernia reducible without skin changes   Musculoskeletal:         General: No swelling. Normal range of motion.      Cervical back: Normal range of motion and neck supple.   Skin:     General: Skin is warm and dry.   Neurological:      General: No focal deficit " present.      Mental Status: He is alert and oriented to person, place, and time.   Psychiatric:         Mood and Affect: Mood normal.         Behavior: Behavior normal.                Assessment / Plan      Diagnoses and all orders for this visit:    1. Right inguinal hernia (Primary)  -     Case Request; Standing  -     sodium chloride 0.9 % flush 10 mL  -     sodium chloride 0.9 % flush 10 mL  -     sodium chloride 0.9 % infusion 40 mL  -     ceFAZolin (ANCEF) 2,000 mg in sodium chloride 0.9 % 100 mL IVPB  -     ondansetron (ZOFRAN) injection 4 mg  -     Case Request    Other orders  -     Follow Anesthesia Guidelines / Protocol; Future  -     Follow Anesthesia Guidelines / Protocol; Standing  -     Verify NPO Status; Standing  -     Verify / Perform Chlorhexidine Skin Prep; Standing  -     Provide NPO Instructions to Patient; Future  -     Chlorhexidine Skin Prep; Future  -     Insert Peripheral IV; Standing  -     Saline Lock & Maintain IV Access; Standing  -     Place Sequential Compression Device; Standing  -     Maintain Sequential Compression Device; Standing    44-year-old male with symptomatic right-sided inguinal hernia that is reducible.  Imaging from 2023 reviewed.  Recommend elective repair.  Will plan for outpatient robotic assisted possible open right possible bilateral inguinal hernia repair with mesh placement and any other indicated procedure.  Risks/benefits/alternatives of the procedure were explained to the patient and he was agreeable to proceed.    Follow Up   Return for Post-op.      Patient was given instructions and counseling regarding his condition or for health maintenance advice. Please see specific information pulled into the AVS if appropriate.     Electronically signed by Morgan Leon MD, 11/04/24, 10:00 AM EST.

## 2024-12-11 ENCOUNTER — ANESTHESIA EVENT (OUTPATIENT)
Dept: PERIOP | Facility: HOSPITAL | Age: 44
End: 2024-12-11
Payer: COMMERCIAL

## 2024-12-12 ENCOUNTER — HOSPITAL ENCOUNTER (OUTPATIENT)
Facility: HOSPITAL | Age: 44
Setting detail: HOSPITAL OUTPATIENT SURGERY
Discharge: HOME | End: 2024-12-12
Attending: STUDENT IN AN ORGANIZED HEALTH CARE EDUCATION/TRAINING PROGRAM | Admitting: STUDENT IN AN ORGANIZED HEALTH CARE EDUCATION/TRAINING PROGRAM
Payer: COMMERCIAL

## 2024-12-12 ENCOUNTER — ANESTHESIA (OUTPATIENT)
Dept: PERIOP | Facility: HOSPITAL | Age: 44
End: 2024-12-12
Payer: COMMERCIAL

## 2024-12-12 VITALS
SYSTOLIC BLOOD PRESSURE: 111 MMHG | HEIGHT: 70 IN | RESPIRATION RATE: 16 BRPM | BODY MASS INDEX: 24.49 KG/M2 | HEART RATE: 64 BPM | DIASTOLIC BLOOD PRESSURE: 62 MMHG | OXYGEN SATURATION: 92 % | TEMPERATURE: 98 F | WEIGHT: 171.08 LBS

## 2024-12-12 DIAGNOSIS — K40.90 RIGHT INGUINAL HERNIA: ICD-10-CM

## 2024-12-12 PROCEDURE — 25810000003 LACTATED RINGERS PER 1000 ML: Performed by: ANESTHESIOLOGY

## 2024-12-12 PROCEDURE — 25010000002 DEXAMETHASONE PER 1 MG: Performed by: ANESTHESIOLOGY

## 2024-12-12 PROCEDURE — 25010000002 MIDAZOLAM PER 1MG: Performed by: ANESTHESIOLOGY

## 2024-12-12 PROCEDURE — 25010000002 ONDANSETRON PER 1 MG

## 2024-12-12 PROCEDURE — 25010000002 LIDOCAINE PF 2% 2 % SOLUTION: Performed by: ANESTHESIOLOGY

## 2024-12-12 PROCEDURE — 25010000002 LIDOCAINE 1% - EPINEPHRINE 1:100000 1 %-1:100000 SOLUTION: Performed by: STUDENT IN AN ORGANIZED HEALTH CARE EDUCATION/TRAINING PROGRAM

## 2024-12-12 PROCEDURE — 49650 LAP ING HERNIA REPAIR INIT: CPT | Performed by: STUDENT IN AN ORGANIZED HEALTH CARE EDUCATION/TRAINING PROGRAM

## 2024-12-12 PROCEDURE — 25010000002 PROPOFOL 200 MG/20ML EMULSION: Performed by: ANESTHESIOLOGY

## 2024-12-12 PROCEDURE — 25010000002 SUGAMMADEX 200 MG/2ML SOLUTION

## 2024-12-12 PROCEDURE — S2900 ROBOTIC SURGICAL SYSTEM: HCPCS | Performed by: STUDENT IN AN ORGANIZED HEALTH CARE EDUCATION/TRAINING PROGRAM

## 2024-12-12 PROCEDURE — 25010000002 HYDROMORPHONE 1 MG/ML SOLUTION

## 2024-12-12 PROCEDURE — 25010000002 FENTANYL CITRATE (PF) 50 MCG/ML SOLUTION: Performed by: ANESTHESIOLOGY

## 2024-12-12 PROCEDURE — 25010000002 CEFAZOLIN PER 500 MG: Performed by: STUDENT IN AN ORGANIZED HEALTH CARE EDUCATION/TRAINING PROGRAM

## 2024-12-12 PROCEDURE — 25010000002 KETOROLAC TROMETHAMINE PER 15 MG: Performed by: ANESTHESIOLOGY

## 2024-12-12 PROCEDURE — C1781 MESH (IMPLANTABLE): HCPCS | Performed by: STUDENT IN AN ORGANIZED HEALTH CARE EDUCATION/TRAINING PROGRAM

## 2024-12-12 DEVICE — ABSORBABLE WOUND CLOSURE DEVICE
Type: IMPLANTABLE DEVICE | Site: ABDOMEN | Status: FUNCTIONAL
Brand: V-LOC 180

## 2024-12-12 DEVICE — MESH HERN 3DMAX MID/ANAT 3X5IN MD RT: Type: IMPLANTABLE DEVICE | Site: ABDOMEN | Status: FUNCTIONAL

## 2024-12-12 RX ORDER — SODIUM CHLORIDE 0.9 % (FLUSH) 0.9 %
10 SYRINGE (ML) INJECTION EVERY 12 HOURS SCHEDULED
Status: DISCONTINUED | OUTPATIENT
Start: 2024-12-12 | End: 2024-12-12 | Stop reason: HOSPADM

## 2024-12-12 RX ORDER — MIDAZOLAM HYDROCHLORIDE 2 MG/2ML
2 INJECTION, SOLUTION INTRAMUSCULAR; INTRAVENOUS ONCE
Status: COMPLETED | OUTPATIENT
Start: 2024-12-12 | End: 2024-12-12

## 2024-12-12 RX ORDER — DEXMEDETOMIDINE HYDROCHLORIDE 100 UG/ML
INJECTION, SOLUTION INTRAVENOUS AS NEEDED
Status: DISCONTINUED | OUTPATIENT
Start: 2024-12-12 | End: 2024-12-12 | Stop reason: SURG

## 2024-12-12 RX ORDER — ONDANSETRON 2 MG/ML
4 INJECTION INTRAMUSCULAR; INTRAVENOUS EVERY 6 HOURS PRN
Status: DISCONTINUED | OUTPATIENT
Start: 2024-12-12 | End: 2024-12-12 | Stop reason: HOSPADM

## 2024-12-12 RX ORDER — ROCURONIUM BROMIDE 10 MG/ML
INJECTION, SOLUTION INTRAVENOUS AS NEEDED
Status: DISCONTINUED | OUTPATIENT
Start: 2024-12-12 | End: 2024-12-12 | Stop reason: SURG

## 2024-12-12 RX ORDER — SODIUM CHLORIDE, SODIUM LACTATE, POTASSIUM CHLORIDE, CALCIUM CHLORIDE 600; 310; 30; 20 MG/100ML; MG/100ML; MG/100ML; MG/100ML
9 INJECTION, SOLUTION INTRAVENOUS CONTINUOUS PRN
Status: DISCONTINUED | OUTPATIENT
Start: 2024-12-12 | End: 2024-12-12 | Stop reason: HOSPADM

## 2024-12-12 RX ORDER — HYDROMORPHONE HYDROCHLORIDE 1 MG/ML
0.25 INJECTION, SOLUTION INTRAMUSCULAR; INTRAVENOUS; SUBCUTANEOUS
Status: DISCONTINUED | OUTPATIENT
Start: 2024-12-12 | End: 2024-12-12 | Stop reason: HOSPADM

## 2024-12-12 RX ORDER — PROMETHAZINE HYDROCHLORIDE 12.5 MG/1
25 TABLET ORAL ONCE AS NEEDED
Status: DISCONTINUED | OUTPATIENT
Start: 2024-12-12 | End: 2024-12-12 | Stop reason: HOSPADM

## 2024-12-12 RX ORDER — PROMETHAZINE HYDROCHLORIDE 25 MG/1
25 SUPPOSITORY RECTAL ONCE AS NEEDED
Status: DISCONTINUED | OUTPATIENT
Start: 2024-12-12 | End: 2024-12-12 | Stop reason: HOSPADM

## 2024-12-12 RX ORDER — OXYCODONE HYDROCHLORIDE 5 MG/1
5 TABLET ORAL
Status: DISCONTINUED | OUTPATIENT
Start: 2024-12-12 | End: 2024-12-12 | Stop reason: HOSPADM

## 2024-12-12 RX ORDER — KETAMINE HYDROCHLORIDE 10 MG/ML
INJECTION, SOLUTION INTRAMUSCULAR; INTRAVENOUS AS NEEDED
Status: DISCONTINUED | OUTPATIENT
Start: 2024-12-12 | End: 2024-12-12 | Stop reason: SURG

## 2024-12-12 RX ORDER — OXYCODONE HYDROCHLORIDE 5 MG/1
5 TABLET ORAL EVERY 6 HOURS PRN
Qty: 20 TABLET | Refills: 0 | Status: SHIPPED | OUTPATIENT
Start: 2024-12-12

## 2024-12-12 RX ORDER — HYDROMORPHONE HYDROCHLORIDE 1 MG/ML
0.5 INJECTION, SOLUTION INTRAMUSCULAR; INTRAVENOUS; SUBCUTANEOUS
Status: DISCONTINUED | OUTPATIENT
Start: 2024-12-12 | End: 2024-12-12 | Stop reason: HOSPADM

## 2024-12-12 RX ORDER — LIDOCAINE HYDROCHLORIDE 20 MG/ML
INJECTION, SOLUTION EPIDURAL; INFILTRATION; INTRACAUDAL; PERINEURAL AS NEEDED
Status: DISCONTINUED | OUTPATIENT
Start: 2024-12-12 | End: 2024-12-12 | Stop reason: SURG

## 2024-12-12 RX ORDER — FENTANYL CITRATE 50 UG/ML
INJECTION, SOLUTION INTRAMUSCULAR; INTRAVENOUS AS NEEDED
Status: DISCONTINUED | OUTPATIENT
Start: 2024-12-12 | End: 2024-12-12 | Stop reason: SURG

## 2024-12-12 RX ORDER — ONDANSETRON 4 MG/1
4 TABLET, ORALLY DISINTEGRATING ORAL ONCE AS NEEDED
Status: DISCONTINUED | OUTPATIENT
Start: 2024-12-12 | End: 2024-12-12 | Stop reason: HOSPADM

## 2024-12-12 RX ORDER — LIDOCAINE HYDROCHLORIDE AND EPINEPHRINE 10; 10 MG/ML; UG/ML
INJECTION, SOLUTION INFILTRATION; PERINEURAL AS NEEDED
Status: DISCONTINUED | OUTPATIENT
Start: 2024-12-12 | End: 2024-12-12 | Stop reason: HOSPADM

## 2024-12-12 RX ORDER — SODIUM CHLORIDE 9 MG/ML
40 INJECTION, SOLUTION INTRAVENOUS AS NEEDED
Status: DISCONTINUED | OUTPATIENT
Start: 2024-12-12 | End: 2024-12-12 | Stop reason: HOSPADM

## 2024-12-12 RX ORDER — SODIUM CHLORIDE 0.9 % (FLUSH) 0.9 %
10 SYRINGE (ML) INJECTION AS NEEDED
Status: DISCONTINUED | OUTPATIENT
Start: 2024-12-12 | End: 2024-12-12 | Stop reason: HOSPADM

## 2024-12-12 RX ORDER — DEXAMETHASONE SODIUM PHOSPHATE 4 MG/ML
INJECTION, SOLUTION INTRA-ARTICULAR; INTRALESIONAL; INTRAMUSCULAR; INTRAVENOUS; SOFT TISSUE AS NEEDED
Status: DISCONTINUED | OUTPATIENT
Start: 2024-12-12 | End: 2024-12-12 | Stop reason: SURG

## 2024-12-12 RX ORDER — MEPERIDINE HYDROCHLORIDE 25 MG/ML
12.5 INJECTION INTRAMUSCULAR; INTRAVENOUS; SUBCUTANEOUS
Status: DISCONTINUED | OUTPATIENT
Start: 2024-12-12 | End: 2024-12-12 | Stop reason: HOSPADM

## 2024-12-12 RX ORDER — KETOROLAC TROMETHAMINE 30 MG/ML
30 INJECTION, SOLUTION INTRAMUSCULAR; INTRAVENOUS ONCE
Status: COMPLETED | OUTPATIENT
Start: 2024-12-12 | End: 2024-12-12

## 2024-12-12 RX ORDER — ONDANSETRON 2 MG/ML
INJECTION INTRAMUSCULAR; INTRAVENOUS AS NEEDED
Status: DISCONTINUED | OUTPATIENT
Start: 2024-12-12 | End: 2024-12-12 | Stop reason: SURG

## 2024-12-12 RX ORDER — ONDANSETRON 2 MG/ML
4 INJECTION INTRAMUSCULAR; INTRAVENOUS ONCE AS NEEDED
Status: DISCONTINUED | OUTPATIENT
Start: 2024-12-12 | End: 2024-12-12 | Stop reason: HOSPADM

## 2024-12-12 RX ORDER — ACETAMINOPHEN 500 MG
1000 TABLET ORAL ONCE
Status: COMPLETED | OUTPATIENT
Start: 2024-12-12 | End: 2024-12-12

## 2024-12-12 RX ORDER — OXYCODONE HYDROCHLORIDE 5 MG/1
5 TABLET ORAL EVERY 6 HOURS PRN
Qty: 20 TABLET | Refills: 0 | Status: SHIPPED | OUTPATIENT
Start: 2024-12-12 | End: 2025-12-12

## 2024-12-12 RX ORDER — SODIUM CHLORIDE 9 MG/ML
INJECTION, SOLUTION INTRAVENOUS AS NEEDED
Status: DISCONTINUED | OUTPATIENT
Start: 2024-12-12 | End: 2024-12-12 | Stop reason: HOSPADM

## 2024-12-12 RX ORDER — MAGNESIUM HYDROXIDE 1200 MG/15ML
LIQUID ORAL AS NEEDED
Status: DISCONTINUED | OUTPATIENT
Start: 2024-12-12 | End: 2024-12-12 | Stop reason: HOSPADM

## 2024-12-12 RX ORDER — PROPOFOL 10 MG/ML
INJECTION, EMULSION INTRAVENOUS AS NEEDED
Status: DISCONTINUED | OUTPATIENT
Start: 2024-12-12 | End: 2024-12-12 | Stop reason: SURG

## 2024-12-12 RX ADMIN — DEXAMETHASONE SODIUM PHOSPHATE 8 MG: 4 INJECTION, SOLUTION INTRAMUSCULAR; INTRAVENOUS at 09:27

## 2024-12-12 RX ADMIN — ONDANSETRON 4 MG: 2 INJECTION INTRAMUSCULAR; INTRAVENOUS at 11:11

## 2024-12-12 RX ADMIN — LIDOCAINE HYDROCHLORIDE 100 MG: 20 INJECTION, SOLUTION EPIDURAL; INFILTRATION; INTRACAUDAL; PERINEURAL at 09:17

## 2024-12-12 RX ADMIN — MIDAZOLAM HYDROCHLORIDE 2 MG: 1 INJECTION, SOLUTION INTRAMUSCULAR; INTRAVENOUS at 08:55

## 2024-12-12 RX ADMIN — HYDROMORPHONE HYDROCHLORIDE 0.5 MG: 1 INJECTION, SOLUTION INTRAMUSCULAR; INTRAVENOUS; SUBCUTANEOUS at 09:50

## 2024-12-12 RX ADMIN — SODIUM CHLORIDE 2000 MG: 9 INJECTION, SOLUTION INTRAVENOUS at 09:23

## 2024-12-12 RX ADMIN — SODIUM CHLORIDE, SODIUM LACTATE, POTASSIUM CHLORIDE, CALCIUM CHLORIDE 9 ML/HR: 20; 30; 600; 310 INJECTION, SOLUTION INTRAVENOUS at 08:54

## 2024-12-12 RX ADMIN — KETAMINE HYDROCHLORIDE 30 MG: 10 INJECTION INTRAMUSCULAR; INTRAVENOUS at 09:46

## 2024-12-12 RX ADMIN — SUGAMMADEX 200 MG: 100 INJECTION, SOLUTION INTRAVENOUS at 11:25

## 2024-12-12 RX ADMIN — KETOROLAC TROMETHAMINE 30 MG: 30 INJECTION, SOLUTION INTRAMUSCULAR; INTRAVENOUS at 12:01

## 2024-12-12 RX ADMIN — KETAMINE HYDROCHLORIDE 20 MG: 10 INJECTION INTRAMUSCULAR; INTRAVENOUS at 09:36

## 2024-12-12 RX ADMIN — ACETAMINOPHEN 1000 MG: 500 TABLET ORAL at 08:53

## 2024-12-12 RX ADMIN — PROPOFOL 150 MG: 10 INJECTION, EMULSION INTRAVENOUS at 09:17

## 2024-12-12 RX ADMIN — ROCURONIUM BROMIDE 20 MG: 50 INJECTION INTRAVENOUS at 10:30

## 2024-12-12 RX ADMIN — PROPOFOL 50 MG: 10 INJECTION, EMULSION INTRAVENOUS at 09:20

## 2024-12-12 RX ADMIN — FENTANYL CITRATE 50 MCG: 50 INJECTION, SOLUTION INTRAMUSCULAR; INTRAVENOUS at 09:17

## 2024-12-12 RX ADMIN — DEXMEDETOMIDINE HYDROCHLORIDE 8 MCG: 100 INJECTION, SOLUTION, CONCENTRATE INTRAVENOUS at 10:16

## 2024-12-12 RX ADMIN — DEXMEDETOMIDINE HYDROCHLORIDE 8 MCG: 100 INJECTION, SOLUTION, CONCENTRATE INTRAVENOUS at 11:11

## 2024-12-12 RX ADMIN — ROCURONIUM BROMIDE 100 MG: 50 INJECTION INTRAVENOUS at 09:17

## 2024-12-12 RX ADMIN — DEXMEDETOMIDINE HYDROCHLORIDE 8 MCG: 100 INJECTION, SOLUTION, CONCENTRATE INTRAVENOUS at 10:30

## 2024-12-12 RX ADMIN — FENTANYL CITRATE 50 MCG: 50 INJECTION, SOLUTION INTRAMUSCULAR; INTRAVENOUS at 09:20

## 2024-12-12 NOTE — NURSING NOTE
Blood Borne Pathogen exposure panel order placed by employee health r/t blood exposure to eye of [facility]. Blood collected by phlebotomist at 1220 and sent to lab.

## 2024-12-12 NOTE — ANESTHESIA PREPROCEDURE EVALUATION
Anesthesia Evaluation     Patient summary reviewed and Nursing notes reviewed   no history of anesthetic complications:   NPO Solid Status: > 8 hours  NPO Liquid Status: > 2 hours           Airway   Mallampati: II  TM distance: >3 FB  Neck ROM: full  No difficulty expected  Dental      Pulmonary - negative pulmonary ROS and normal exam    breath sounds clear to auscultation  Cardiovascular - negative cardio ROS and normal exam  Exercise tolerance: good (4-7 METS)    Rhythm: regular  Rate: normal        Neuro/Psych- negative ROS  GI/Hepatic/Renal/Endo - negative ROS     Musculoskeletal (-) negative ROS    Abdominal    Substance History - negative use     OB/GYN negative ob/gyn ROS         Other - negative ROS       ROS/Med Hx Other: PAT Nursing Notes unavailable.               Anesthesia Plan    ASA 2     general     (Patient understands anesthesia not responsible for dental damage.  Pt on suboxone, took this am, discusssed possibel difficilty with pain control. Use adjuncts for pain control)  intravenous induction     Anesthetic plan, risks, benefits, and alternatives have been provided, discussed and informed consent has been obtained with: patient.  Pre-procedure education provided  Plan discussed with CRNA.    CODE STATUS:

## 2024-12-12 NOTE — H&P
[facility]   GENERAL SURGERY  HISTORY AND PHYSICAL    Patient Name: Nahum Jara  : 1980  MRN: [id number]  Primary Care Physician:  Ibis Munoz Known  Date of admission: 2024    Subjective     Chief Complaint:  right inguinal hernia    HPI:  Nahum Jara is a 44 y.o. male who presents today as a new referral for a right-sided inguinal hernia. Patient states he had a right inguinal hernia repair as a small child and is had a recurrence for at least the past several years. It has become progressively larger over time and is started to cause some daily pain. He is always able to manually reduce the hernia without much difficulty. He does intermittently struggle with constipation but denies any other symptoms suggestive of obstruction. He has no previous abdominal surgical history.     Personal History   Allergies:  No Known Allergies    Home Medications:  Prior to Admission medications    Medication Sig Start Date End Date Taking? Authorizing Provider   Acetaminophen Extra Strength 500 MG tablet  10/7/24   Rima Munoz MD   buprenorphine-naloxone (SUBOXONE) 8-2 MG per SL tablet Place 1 tablet under the tongue Daily. 10/7/24   Rima Munoz MD   cyclobenzaprine (FLEXERIL) 5 MG tablet Take 1 tablet by mouth 1 (One) Time As Needed. 10/7/24   Rima Munoz MD   ibuprofen (ADVIL,MOTRIN) 400 MG tablet Take 1 tablet by mouth Every 8 (Eight) Hours As Needed. 10/7/24   Rima Munoz MD   nicotine polacrilex (NICORETTE) 4 MG gum Chew 1 each As Needed. 10/28/24   Rima Munoz MD   OneLax Magnesium Citrate 1.745 GM/30ML solution solution Take 296 mL by mouth 1 (One) Time. 10/23/24   Rima Munoz MD   polyethylene glycol (MIRALAX) 17 GM/SCOOP powder  10/23/24   Rima Munoz MD   traZODone (DESYREL) 50 MG tablet Take 1 tablet by mouth Every Night. 10/7/24   Rima Munoz MD       Past Medical History:   Diagnosis Date    Inguinal  "hernia        Past Surgical History:   Procedure Laterality Date    EAR TUBES      INGUINAL HERNIA REPAIR Right        Social History:  reports that he has been smoking cigarettes. He uses smokeless tobacco. He reports that he does not currently use alcohol. He reports current drug use. Drug: Other.    Family History: family history includes No Known Problems in his father and mother; Stroke in his paternal grandfather and paternal grandmother. Otherwise pertinent FHx was reviewed and not pertinent to current issue.    Review of Systems: Review of systems is noncontributory besides as mentioned in above HPI section.   All systems were reviewed and negative except for: none    Objective   Vitals:      Physical Exam   Constitutional: healthy appearing, alert, no acute distress, reliable historian  HENT:  NCAT, no visible deformities or lesions  Eyes:  sclerae clear, conjunctivae clear, EOMI  Neck:  normal appearance, no masses, trachea midline  Respiratory:  breathing not labored, respiratory effort appears normal  Cardiovascular:  heart regular rate and rhythm  Abdomen:  soft, nontender, nondistended  Right inguinal hernia reducible without skin changes   Skin and subcutaneous tissue:  no visible concerning rashes or lesions, no jaundice  Musculoskeletal: moving all extremities symmetrically and purposefully  Neurologic:  no obvious motor or sensory deficits, cerebellar function without any obvious abnormalities, alert & oriented x 3, speech clear  Psychiatric:  judgment and insight intact, mood normal, affect appropriate, cooperative                      Invalid input(s): \"PROT\"      No results found for: \"LIPASE\"  No results found for: \"INR\"  No results found for: \"LACTATE\"    Radiology:  No orders to display          LABS:  Lab Results (last 48 hours)       ** No results found for the last 48 hours. **            IMAGING:  Imaging Results (Last 48 Hours)       ** No results found for the last 48 hours. **      "       Result Review:  I have personally reviewed the following checkmarked results from the time of this admission to 12/12/2024 07:26 EST:  [x]  Laboratory  []  Microbiology  []  Radiology  []  EKG/Telemetry   []  Cardiology/Vascular   []  Pathology  []  Old records  []  Other:        Assessment & Plan   Assessment / Plan     Assessment:  Active Hospital Problems:  Active Hospital Problems    Diagnosis     **Right inguinal hernia        Plan:   1. Right inguinal hernia (Primary)     44-year-old male with symptomatic right-sided inguinal hernia that is reducible. Imaging from 2023 reviewed. Recommend elective repair. Will plan for outpatient robotic assisted possible open right possible bilateral inguinal hernia repair with mesh placement and any other indicated procedure. Risks/benefits/alternatives of the procedure were explained to the patient and he was agreeable to proceed.     Electronically signed by Morgan Leon MD, 12/12/24, 7:26 AM EST.

## 2024-12-12 NOTE — DISCHARGE INSTRUCTIONS
DISCHARGE INSTRUCTIONS  HERNIA      For your surgery you had:  General anesthesia (you may have a sore throat for the first 24 hours)  IV sedation.  Local anesthesia  Monitored anesthesia care  You received a medicated patch for nausea prevention today (behind your ear). It is recommended that you remove it 24-48 hours post-operatively. It must be removed within 72 hours.   You received an anesthesia medication today that can cause hormonal forms of birth control to be ineffective. You should use a different form of birth control (to prevent pregnancy) for 7 days.  You may experience dizziness, drowsiness, or light-headedness for several hours following surgery/procedure.  Do not stay alone today or tonight.  Limit your activity for 24 hours.  Resume your diet slowly.  Follow whatever special dietary instructions you may have been given by your doctor.  You should not drive, operate machinery, drink alcohol, or sign legally binding documents for 24 hours or while you are taking pain medication.  NOTIFY YOUR DOCTOR IF YOU EXPERIENCE ANY OF THE FOLLOWING:  Temperature greater than 101 degrees Fahrenheit  Shaking Chills  Redness or excessive drainage from incision  Nausea, vomiting and/or pain that is not controlled by prescribed medications  Increase in bleeding or bleeding that is excessive  Unable to urinate in 6 hours after surgery  If unable to reach your doctor, please go to the closest Emergency Room [] You may remove dressing:   [] in 24 hours   [] in 48 hours   [] Skin adhesive flakes off in 10-14 days.   [] You may shower ___, no submerging of incisions for 2 weeks.  Apply an ice pack for 24-48 hours.  [] Wear a jockey support or tight fitting briefs to prevent swelling.  Do not do any heavy lifting, pushing or pulling.  You may walk up and down stairs.  You may ride in a car but do not drive until instructed by your physician.  Avoid constipation.  If unable to urinate in 6 to 8 hours after surgery or  urinating frequently in small amounts, notify your doctor or go to the nearest Emergency Room.  Medications per physician instructions as indicated on Discharge Medication Information Sheet.    Last dose of pain medication was given at:   Tylenol 1000 mg at 0853 am-may repeat tylenol as needed every 4-6 hours with next dose at 12:53 pm if needed. DO NOT EXCEED 4000 mg of tylenol in 24 hours. Toradol 30 mg at 12:01 pm-may repeat ibuprofen every 6-8 hours as needed with next dose at 6:01 pm if needed. May take Oxycodone as prescribed every 6 hours.   .    SPECIAL INSTRUCTIONS:

## 2024-12-12 NOTE — OP NOTE
INGUINAL HERNIA REPAIR LAPAROSCOPIC WITH DAVINCI ROBOT  Procedure Report    Patient Name:  Nahum Jara  YOB: 1980    Date of Surgery:  12/12/2024     Indications:  Recurrent right inguinal hernia    Pre-op Diagnosis:   Right inguinal hernia [K40.90]       Post-Op Diagnosis Codes:     * Right inguinal hernia [K40.90]    Procedure/CPT® Codes:      Procedure(s):  Robotic-assisted right inguinal hernia repair with mesh placement        Staff:  Surgeon(s):  Morgan Leon MD    Assistant: Adia Pepper RN CSA    Anesthesia: General    Estimated Blood Loss: 10 mL    Implants:    Implant Name Type Inv. Item Serial No.  Lot No. LRB No. Used Action   DEV CLS WND VLOC/180 BRAXTON ABS 1/2CIR SZ2/0 15CM 27MM GRN - [id number] Implant DEV CLS WND VLOC/180 BRAXTON ABS 1/2CIR SZ2/0 15CM 27MM GRN  COVIDIEN [id number] N/A 1 Implanted   MESH CALIN 3DMAX MID/DONITA 3X5IN MD RT - [id number] Implant MESH CALIN 3DMAX MID/DONITA 3X5IN MD RT  [place] [employer] [id number] Right 1 Implanted       Specimen:          None        Findings: Large right inguinal hernia with indirect defect    Complications: None apparent at the time of surgery    Description of Procedure: Informed consent was obtained before the patient was brought to the operating suite and placed in supine position.  General endotracheal anesthesia was induced and maintained throughout the procedure. Patient's abdomen and groins were prepped and draped in a standard sterile fashion before timeout procedure was performed, verifying the correct patient, procedure, and other pertinent information.     Using a #15 blade scalpel, an incision was made in the umbilicus and the abdomen was entered with Casey technique. A 12 mm balloon trocar was inserted and the gas applied to achieve adequate pneumoperitoneum of 15 mmHg.  Laparoscope was inserted and confirmed safe entrance into the abdomen.  Under direct visualization, two additional 8 mm trocars were placed:  one in the right hemiabdomen and one in the left hemiabdomen.  The robot was then successfully docked.     Laparoscopic exam confirmed the presence of a right-sided inguinal hernia. Using sharp dissection, the preperitoneal space on the right was developed to expose the indirect defect and Denis's ligament medially.  During this process, great care was taken to ensure no damage to the epigastric and gonadal vessels, and the nearby inguinal nerves.  Using a combination of blunt and sharp dissection the indirect hernia sac was dissected off the internal ring and fully reduced. Once the hernia was fully reduced was noted to remain reduced without any tension.  There was  no evidence of a direct or femoral defect on the right side after completion of the dissection in the myopectineal orifice.       A right-sided Bard 3DMax MID anatomical mesh was placed with its medial aspect over Denis's ligament so that it easily covered the femoral, direct, and indirect spaces.  The mesh was then secured to Denis's ligament and to the anterior abdominal wall using a 3-0 Vicryl.  After the mesh was secured, it was noted to be lying flat while covering all spaces for potential hernias.  The peritoneal defect was then closed over top the mesh using a running 2-0 V-Loc suture.     Hemostasis was verified.  The robot was then successfully undocked, all trocars were removed under direct visualization, and the pneumoperitoneum released.  Both testicles were noted to remain in the scrotum at the conclusion of this portion of the procedure.  Umbilical defect was then closed using 0 PDS in figure-of-eight fashion. Skin at all sites was then closed using running subcuticular 4-0 Monocryl.  Incisions were cleaned and dried before application of skin glue over top to conclude the procedure.     Patient tolerated the procedure well and there were no immediate complications. All counts were correct x2 at the end of the  procedure.    Disposition: Stable in PACU        The surgical first assist listed above assisted with all needed aspects of the procedure.    Electronically signed by Morgan Leon MD, 12/12/24, 11:25 AM EST.

## 2024-12-23 ENCOUNTER — OFFICE VISIT (OUTPATIENT)
Dept: SURGERY | Facility: CLINIC | Age: 44
End: 2024-12-23
Payer: COMMERCIAL

## 2024-12-23 VITALS
WEIGHT: 172.2 LBS | BODY MASS INDEX: 24.65 KG/M2 | HEIGHT: 70 IN | SYSTOLIC BLOOD PRESSURE: 98 MMHG | DIASTOLIC BLOOD PRESSURE: 72 MMHG | HEART RATE: 79 BPM

## 2024-12-23 DIAGNOSIS — Z98.890 POST-OPERATIVE STATE: Primary | ICD-10-CM

## 2024-12-23 RX ORDER — CLONIDINE HYDROCHLORIDE 0.1 MG/1
0.1 TABLET ORAL ONCE
COMMUNITY

## 2024-12-23 NOTE — PROGRESS NOTES
"Chief Complaint  Post-op Follow-up (Robotic-assisted right inguinal hernia repair with mesh placement/ /)    Subjective    Subjective     Nahum Jara is a 44 y.o. male who presents to [facility] GENERAL SURGERY    History of Present Illness  44-year-old male who presents today for routine postoperative follow-up after undergoing robotic assisted repair of a large right sided inguinal scrotal hernia.  Postoperatively he had some issues with joint pain that resolved after a few days.  Over the weekend he states he took a large step with his right leg and felt a pulling sensation in his groin and noticed some swelling afterwards.  He denies any nausea, vomiting, fevers, chills.  He struggles with chronic constipation secondary to his Suboxone use and this is unchanged since surgery.  Post-op Follow-up      Personal History     Social History     Tobacco Use    Smoking status: Every Day     Current packs/day: 1.00     Types: Cigarettes    Smokeless tobacco: Current    Tobacco comments:     Smoked 12/11/24   Vaping Use    Vaping status: Some Days    Substances: Nicotine    Devices: Disposable, Pre-filled or refillable cartridge, Refillable tank, Pre-filled pod   Substance Use Topics    Alcohol use: Not Currently    Drug use: Yes     Types: Other     Comment: clean since jan 2024     No Known Allergies    Objective    Objective       Vital Signs:   BP 98/72 (BP Location: Left arm, Patient Position: Sitting, Cuff Size: Adult)   Pulse 79   Ht 177.8 cm (70\")   Wt 78.1 kg (172 lb 3.2 oz)   BMI 24.71 kg/m²       Physical Exam  Constitutional:       Appearance: Normal appearance.   HENT:      Head: Normocephalic and atraumatic.      Mouth/Throat:      Mouth: Mucous membranes are moist.      Pharynx: Oropharynx is clear.   Cardiovascular:      Rate and Rhythm: Normal rate and regular rhythm.   Pulmonary:      Effort: Pulmonary effort is normal. No respiratory distress.   Abdominal:      General: " There is no distension.      Palpations: Abdomen is soft.      Tenderness: There is no abdominal tenderness.      Comments: Right groin swelling concerning for recurrent inguinal hernia.  Laparoscopic incisions x 3 all well-healed.   Musculoskeletal:         General: No swelling. Normal range of motion.      Cervical back: Normal range of motion and neck supple.   Skin:     General: Skin is warm and dry.   Neurological:      General: No focal deficit present.      Mental Status: He is alert and oriented to person, place, and time.   Psychiatric:         Mood and Affect: Mood normal.         Behavior: Behavior normal.                  Assessment / Plan      Diagnoses and all orders for this visit:    1. Post-operative state (Primary)  -     CT Abdomen Pelvis Without Contrast; Future    45-year-old male status post robotic assisted right inguinal hernia repair of a large inguinoscrotal defect.  Felt a tearing sensation over the weekend and has had some swelling in his right groin since.  Will follow-up with CT abdomen pelvis and see again in the office after this is obtained.    Follow Up   Return in about 4 weeks (around 1/20/2025).      Patient was given instructions and counseling regarding his condition or for health maintenance advice. Please see specific information pulled into the AVS if appropriate.     Electronically signed by Morgan Leon MD, 12/23/24, 9:58 AM EST.

## 2025-01-20 ENCOUNTER — OFFICE VISIT (OUTPATIENT)
Dept: SURGERY | Facility: CLINIC | Age: 45
End: 2025-01-20
Payer: COMMERCIAL

## 2025-01-20 VITALS
HEART RATE: 84 BPM | SYSTOLIC BLOOD PRESSURE: 122 MMHG | HEIGHT: 70 IN | DIASTOLIC BLOOD PRESSURE: 74 MMHG | WEIGHT: 173 LBS | BODY MASS INDEX: 24.77 KG/M2

## 2025-01-20 DIAGNOSIS — K40.91 RECURRENT RIGHT INGUINAL HERNIA: Primary | ICD-10-CM

## 2025-01-20 PROCEDURE — 1159F MED LIST DOCD IN RCRD: CPT | Performed by: STUDENT IN AN ORGANIZED HEALTH CARE EDUCATION/TRAINING PROGRAM

## 2025-01-20 PROCEDURE — 1160F RVW MEDS BY RX/DR IN RCRD: CPT | Performed by: STUDENT IN AN ORGANIZED HEALTH CARE EDUCATION/TRAINING PROGRAM

## 2025-01-20 PROCEDURE — 99024 POSTOP FOLLOW-UP VISIT: CPT | Performed by: STUDENT IN AN ORGANIZED HEALTH CARE EDUCATION/TRAINING PROGRAM

## 2025-01-20 NOTE — PROGRESS NOTES
"Chief Complaint  Follow-up (1 month f/u (R) Inguinal Hernia )    Subjective    Subjective     Nahum Jara is a 44 y.o. male who presents to [facility] GENERAL SURGERY    History of Present Illness  44-year-old male who previous underwent robotic assisted right inguinal hernia repair with mesh placement presents today for 1 month follow-up on right groin pain.  Since surgery, his pain has gotten somewhat better overall but he still feels a bulge in his right groin.  He denies any nausea, vomiting, fevers, chills.  He has been tolerating a regular diet and having normal bowel movements.      Personal History     Social History     Tobacco Use    Smoking status: Every Day     Current packs/day: 1.00     Types: Cigarettes    Smokeless tobacco: Current    Tobacco comments:     Smoked 12/11/24   Vaping Use    Vaping status: Some Days    Substances: Nicotine    Devices: Disposable, Pre-filled or refillable cartridge, Refillable tank, Pre-filled pod   Substance Use Topics    Alcohol use: Not Currently    Drug use: Yes     Types: Other     Comment: clean since jan 2024     No Known Allergies    Objective    Objective       Vital Signs:   /74   Pulse 84   Ht 177.8 cm (70\")   Wt 78.5 kg (173 lb)   BMI 24.82 kg/m²       Physical Exam  Constitutional:       Appearance: Normal appearance.   HENT:      Head: Normocephalic and atraumatic.      Mouth/Throat:      Mouth: Mucous membranes are moist.      Pharynx: Oropharynx is clear.   Cardiovascular:      Rate and Rhythm: Normal rate and regular rhythm.   Pulmonary:      Effort: Pulmonary effort is normal. No respiratory distress.   Abdominal:      General: There is no distension.      Palpations: Abdomen is soft.      Tenderness: There is no abdominal tenderness.      Comments: Right sided groin bulge possibly consistent with recurrent inguinal hernia   Musculoskeletal:         General: No swelling. Normal range of motion.      Cervical back: " Normal range of motion and neck supple.   Skin:     General: Skin is warm and dry.   Neurological:      General: No focal deficit present.      Mental Status: He is alert and oriented to person, place, and time.   Psychiatric:         Mood and Affect: Mood normal.         Behavior: Behavior normal.                  Assessment / Plan      Diagnoses and all orders for this visit:    1. Recurrent right inguinal hernia (Primary)  -     CT Abdomen Pelvis With Contrast; Future    44-year-old male with concern for recurrent right-sided inguinal hernia.  Will plan for CT abdomen pelvis to assess for recurrence and call patient with results to discuss any needed operative intervention.    Follow Up   Return for will call with CT results.      Patient was given instructions and counseling regarding his condition or for health maintenance advice. Please see specific information pulled into the AVS if appropriate.     Electronically signed by Morgan Leon MD, 01/20/25, 9:57 AM EST.

## 2025-02-27 ENCOUNTER — TELEPHONE (OUTPATIENT)
Dept: SURGERY | Facility: CLINIC | Age: 45
End: 2025-02-27
Payer: COMMERCIAL

## 2025-02-27 NOTE — TELEPHONE ENCOUNTER
PT WAS SEEN IN JANUARY AND A CT SCAN WAS ORDERED. HE SAID THAT HE TOLD DR ARIAS THAT HE WANTED IT SCHEDULED AT [facility] AND THAT HAS NEVER BEEN DONE. CAN YOU PLEASE SCHEDULE AND INFORM THE PATIENT.

## 2025-02-27 NOTE — TELEPHONE ENCOUNTER
HUB ok to relay message:     Attempted to call patient to let him know that I was unaware of the need to send this order to [facility], however, it has now been faxed. Unable to reach the patient but left a detailed message with the phone# that he can call the get scheduled. Their phone # is [phone] and he will need to type in extension [phone] (for Central Scheduling). Fax confirmation was received so the patient should be able to schedule now.

## 2025-04-15 ENCOUNTER — TELEPHONE (OUTPATIENT)
Dept: SURGERY | Facility: CLINIC | Age: 45
End: 2025-04-15
Payer: COMMERCIAL

## 2025-04-15 NOTE — TELEPHONE ENCOUNTER
Per [facility], patient did not have imaging done at their location. Number for patient to call the schedule this is [phone]. Will call patient to touch base on this.

## 2025-04-15 NOTE — TELEPHONE ENCOUNTER
Attempted to call patient to see where/if he had this done elsewhere. Left detailed message on patient's machine requesting a call back.

## 2025-04-15 NOTE — TELEPHONE ENCOUNTER
Reach out to [facility] for results of recent CT Abd/Pelvis ordered by Dr. Leon.

## 2025-04-21 ENCOUNTER — TELEPHONE (OUTPATIENT)
Dept: SURGERY | Facility: CLINIC | Age: 45
End: 2025-04-21
Payer: COMMERCIAL

## 2025-04-21 NOTE — TELEPHONE ENCOUNTER
Spoke with the patient and let him know that he had called in February stating that the he wanted this imaging done at [facility]. I faxed the order over to [facility] and when I called to request results, I was told that he did not get this done. I attempted to call the patient to inquire about this and he did not answer. When patient called today, he stated that he does not wish to use [facility], he wanted to use [facility]. I advised him that he did not call to let us know that but that I could go ahead [facility]. Patient was scheduled for this imaging on 4/25/2025 apt time of 3:15pm. Patient to not have anything to eat or drink 2 hours prior to this apt time. Patient aware and agrees to this time and date. Patient was advised to go to the ER if the pain worsens and he does not feel like he can wait.     Patient also states that his pain is so bad that he broke his phone because of how enraged the pain is making him feel. Patient is asking for some pain medication to be sent in for him. He would like this sent to [facility] in [place], [place]. Patient was advised that I would send a message to Dr. Leon requesting this. However, Dr. Leon is not in the office today and the office was getting ready to close. Patient v/u

## 2025-04-21 NOTE — TELEPHONE ENCOUNTER
"TAJ FROM THE HUB TRANSFERRED PATIENT.  SHE SAID HE TOLD HER HE HAD AN INGUINAL HERNIA REPAIR AND HE IS STILL SWOLLEN AND HAVING ABDOMINAL PAIN.  SHE SAID HE TOLD HER HE WAS PSYCHOTIC, AND SHE COULD HEAR HIM SAYING I HATE YOU, AND DIDN'T KNOW IF HE WAS TALKING TO HER.    I SPOKE TO PATIENT.  I TOLD HIM DICK HAD BEEN TRYING TO CALL HIM.    HE SAID HE HAD HERNIA SURGERY IN DECEMBER.  HE IS SWOLLEN ON HIS RIGHT SIDE TESTICLE HUGE.  HE USED EXPLETIVE. HE SAID HE IS PSYCHOTIC AND CAN'T CONTROL HIS ANGER.  HE SAID THAT IS HAS BEEN SWOLLEN 3 - 4 MONTHS AND IT HURTS.      I WAS GOING TO PUT IN A MESSAGE, AND ASKED FOR HIS PHONE NUMBER.  HE SAID HE BROKE HIS PHONE BECAUSE IT HURTS SO \"EXPLETIVE\" BAD.  I ASKED IF HE HAD A PHONE, AND HE SAID HE DIDN'T AND HE HAS HIS DAD'S PHONE NUMBER.    CRISTINO JENNINGS, FOR DR. ARIAS TOOK THE CALL.  I TOLD HER SHE WILL NEED TO GET THE DAD'S PHONE NUMBER FROM PATIENT.  "

## 2025-04-22 ENCOUNTER — TELEPHONE (OUTPATIENT)
Dept: SURGERY | Facility: CLINIC | Age: 45
End: 2025-04-22
Payer: COMMERCIAL

## 2025-04-22 DIAGNOSIS — Z98.890 POST-OPERATIVE STATE: ICD-10-CM

## 2025-04-22 DIAGNOSIS — K40.90 RIGHT INGUINAL HERNIA: ICD-10-CM

## 2025-04-22 DIAGNOSIS — Z98.890 POST-OPERATIVE STATE: Primary | ICD-10-CM

## 2025-04-22 RX ORDER — OXYCODONE HYDROCHLORIDE 5 MG/1
5 TABLET ORAL EVERY 12 HOURS PRN
Qty: 15 TABLET | Refills: 0 | Status: SHIPPED | OUTPATIENT
Start: 2025-04-22 | End: 2025-04-22 | Stop reason: SDUPTHER

## 2025-04-22 RX ORDER — OXYCODONE HYDROCHLORIDE 5 MG/1
5 TABLET ORAL EVERY 12 HOURS PRN
Qty: 15 TABLET | Refills: 0 | Status: SHIPPED | OUTPATIENT
Start: 2025-04-22 | End: 2026-04-22

## 2025-04-22 NOTE — TELEPHONE ENCOUNTER
[facility] CALLED TO ASK ABOUT FILLING THE OXYCODONE THAT SENT TODAY. PATIENT HAS SUBOXONE ALSO PRESCRIBED. JUST LET THEM KNOW IF THEY CAN FILL IT.

## 2025-04-22 NOTE — TELEPHONE ENCOUNTER
Duplicate encounter. Please see other encounter. Closing this encounter. Do not add to this encounter.

## 2025-04-22 NOTE — TELEPHONE ENCOUNTER
PATIENT CALLED,  I TOLD HIM [facility] CALED TO ASK ABOUT FILLING THE OXYCODONE THAT WAS SENT TODAY.  PATIENT HAS SUBOXONE ALSO PRESCRIBED.    I TOLD HIM DR. ARIAS SAID IF HE WANTS TO TAKE THE OXYCODONE, HE WILL HAVE TO STOP THE SUBOXONE.  PATIENT SAID HE WILL STOP THE SUBOXONE TO TAKE THE OXYCODONE.    HE ASKED FOR THE PRESCRIPTION TO BE SENT TO [facility].    PLEASE CALL HIM BACK AT HIS FATHER'S NUMBER.

## 2025-04-22 NOTE — TELEPHONE ENCOUNTER
Attempted to call the patient on his dad's phone (per patient request/as his is broken, see previous encounter). Spoke with the patient's dad and asked him to have the patient give us a call. Patient not currently around his dad.

## 2025-04-23 NOTE — TELEPHONE ENCOUNTER
Left message on patient's dad's phone to call back. Dr. Leon has resent this medication to [facility]. Patient will need to hold Suboxone will taking this prescription. HUB ok to relay to patient if he calls back.

## 2025-04-23 NOTE — TELEPHONE ENCOUNTER
PATIENT CALLED.  I TOLD HIM, PER DICK:    Left message on patient's dad's phone to call back. Dr. Leon has resent this medication to [facility]. Patient will need to hold Suboxone will taking this prescription. HUB ok to relay to patient if he calls back.      HE VOICED UNDERSTANDING.    HE ASKED FOR ADDRESS FOR CT SCAN.  I GAVE HIM THE ADDRESS AND APPOINTMENT, AND TOLD HIM TO CALL BACK WITH ANY QUESTIONS.

## 2025-04-25 ENCOUNTER — TELEPHONE (OUTPATIENT)
Dept: SURGERY | Facility: CLINIC | Age: 45
End: 2025-04-25
Payer: COMMERCIAL

## 2025-04-25 NOTE — TELEPHONE ENCOUNTER
Patient called the office again today requesting to have his CT done at [facility]. Patient states that he is unable to get a ride to [facility] for his apt for today. Patient was advised that I would fax the order to [facility] and that he could call them to see about getting scheduled but that I could not guarantee that they could get him in today. Patient was given the phone number and v/u. Patient was also advised that I would not cancel his apt with [facility] for today yet until we know if he can be seen today at [facility]. Fax# that I sent the order to is: [phone] (scheduling)

## 2025-07-29 ENCOUNTER — TELEPHONE (OUTPATIENT)
Dept: SURGERY | Facility: CLINIC | Age: 45
End: 2025-07-29
Payer: COMMERCIAL

## 2025-07-29 NOTE — TELEPHONE ENCOUNTER
Please review chart as we have attempted to schedule this CT many times for this patient. Does this need to be scheduled still or can this order be cancelled?

## (undated) DEVICE — TROCARS: Brand: KII® BALLOON BLUNT TIP SYSTEM

## (undated) DEVICE — SLV SCD KN/LEN ADJ EXPRSS BLENDED MD 1P/U

## (undated) DEVICE — INTENDED FOR TISSUE SEPARATION, AND OTHER PROCEDURES THAT REQUIRE A SHARP SURGICAL BLADE TO PUNCTURE OR CUT.: Brand: BARD-PARKER ® CARBON RIB-BACK BLADES

## (undated) DEVICE — SEAL

## (undated) DEVICE — STERILE POLYISOPRENE POWDER-FREE SURGICAL GLOVES WITH EMOLLIENT COATING: Brand: PROTEXIS

## (undated) DEVICE — GOWN,REINFRCE,POLY,SIRUS,BREATH SLV,XXLG: Brand: MEDLINE

## (undated) DEVICE — SOL IRR NACL 0.9PCT BO 1000ML

## (undated) DEVICE — TIP COVER ACCESSORY

## (undated) DEVICE — DAVINCI-LF: Brand: MEDLINE INDUSTRIES, INC.

## (undated) DEVICE — TOTAL TRAY, 16FR 10ML SIL FOLEY, URN: Brand: MEDLINE

## (undated) DEVICE — STERILE POLYISOPRENE POWDER-FREE SURGICAL GLOVES: Brand: PROTEXIS

## (undated) DEVICE — SUT PDS2 0 CT1 27IN Z340H MF VIL

## (undated) DEVICE — SUCTION IRRIGATOR: Brand: ENDOWRIST

## (undated) DEVICE — SHEET,DRAPE,70X85,STERILE: Brand: MEDLINE

## (undated) DEVICE — GLV SURG SENSICARE PI ORTHO SZ6.5 LF STRL

## (undated) DEVICE — SYR LL TP 10ML STRL

## (undated) DEVICE — SUT MNCRYL PLS ANTIB UD 4/0 PS2 18IN

## (undated) DEVICE — SYR LUERLOK 30CC

## (undated) DEVICE — ANTIBACTERIAL VIOLET BRAIDED (POLYGLACTIN 910), SYNTHETIC ABSORBABLE SUTURE: Brand: COATED VICRYL

## (undated) DEVICE — 40585 XL ADVANCED TRENDELENBURG POSITIONING KIT: Brand: 40585 XL ADVANCED TRENDELENBURG POSITIONING KIT

## (undated) DEVICE — ARM DRAPE

## (undated) DEVICE — LAPAROVUE VISIBILITY SYSTEM LAPAROSCOPIC SOLUTIONS: Brand: LAPAROVUE

## (undated) DEVICE — CATH IV INSYTE AUTOGARD 14G 1 1/2IN ORNG